# Patient Record
Sex: FEMALE | Race: WHITE | NOT HISPANIC OR LATINO | Employment: OTHER | URBAN - NONMETROPOLITAN AREA
[De-identification: names, ages, dates, MRNs, and addresses within clinical notes are randomized per-mention and may not be internally consistent; named-entity substitution may affect disease eponyms.]

---

## 2023-03-08 DIAGNOSIS — I48.91 ATRIAL FIBRILLATION, UNSPECIFIED TYPE (MULTI): Primary | ICD-10-CM

## 2023-03-08 PROBLEM — F41.9 ANXIETY AND DEPRESSION: Status: ACTIVE | Noted: 2023-03-08

## 2023-03-08 PROBLEM — I10 HYPERTENSION, BENIGN: Status: ACTIVE | Noted: 2023-03-08

## 2023-03-08 PROBLEM — E03.9 HYPOTHYROIDISM: Status: ACTIVE | Noted: 2023-03-08

## 2023-03-08 PROBLEM — I42.8 NON-ISCHEMIC CARDIOMYOPATHY (MULTI): Status: ACTIVE | Noted: 2023-03-08

## 2023-03-08 PROBLEM — M81.0 OSTEOPOROSIS: Status: ACTIVE | Noted: 2023-03-08

## 2023-03-08 PROBLEM — Z97.4 USES HEARING AID: Status: ACTIVE | Noted: 2023-03-08

## 2023-03-08 PROBLEM — E78.5 DYSLIPIDEMIA: Status: ACTIVE | Noted: 2023-03-08

## 2023-03-08 PROBLEM — F32.0 MILD MAJOR DEPRESSION (CMS-HCC): Status: ACTIVE | Noted: 2023-03-08

## 2023-03-08 PROBLEM — H91.93 HEARING LOSS, BILATERAL: Status: ACTIVE | Noted: 2023-03-08

## 2023-03-08 PROBLEM — F32.A ANXIETY AND DEPRESSION: Status: ACTIVE | Noted: 2023-03-08

## 2023-03-08 RX ORDER — RIVAROXABAN 20 MG/1
1 TABLET, FILM COATED ORAL
COMMUNITY
Start: 2019-01-28 | End: 2024-02-19 | Stop reason: WASHOUT

## 2023-03-08 RX ORDER — ATORVASTATIN CALCIUM 20 MG/1
20 TABLET, FILM COATED ORAL DAILY
COMMUNITY
End: 2024-02-19 | Stop reason: WASHOUT

## 2023-03-08 RX ORDER — LEVOTHYROXINE SODIUM 75 UG/1
1 TABLET ORAL DAILY
COMMUNITY
Start: 2020-08-03 | End: 2024-02-19 | Stop reason: WASHOUT

## 2023-03-08 RX ORDER — CARVEDILOL 12.5 MG/1
12.5 TABLET ORAL 2 TIMES DAILY
Qty: 180 TABLET | Refills: 3 | Status: SHIPPED | OUTPATIENT
Start: 2023-03-08 | End: 2024-02-19 | Stop reason: WASHOUT

## 2023-03-08 RX ORDER — CYCLOBENZAPRINE HCL 10 MG
TABLET ORAL
COMMUNITY
Start: 2023-02-22

## 2023-03-08 RX ORDER — PANTOPRAZOLE SODIUM 40 MG/1
1 TABLET, DELAYED RELEASE ORAL DAILY
COMMUNITY
Start: 2020-11-04

## 2023-03-08 RX ORDER — HYDROXYZINE HYDROCHLORIDE 10 MG/1
TABLET, FILM COATED ORAL
COMMUNITY
Start: 2021-06-01

## 2023-03-08 RX ORDER — LOSARTAN POTASSIUM 25 MG/1
1 TABLET ORAL DAILY
COMMUNITY
End: 2024-02-19 | Stop reason: WASHOUT

## 2023-03-08 RX ORDER — CARVEDILOL 12.5 MG/1
1 TABLET ORAL 2 TIMES DAILY
COMMUNITY
Start: 2021-12-02 | End: 2023-03-08 | Stop reason: SDUPTHER

## 2023-04-25 LAB
ALANINE AMINOTRANSFERASE (SGPT) (U/L) IN SER/PLAS: 15 U/L (ref 7–45)
ALBUMIN (G/DL) IN SER/PLAS: 4.3 G/DL (ref 3.4–5)
ALKALINE PHOSPHATASE (U/L) IN SER/PLAS: 68 U/L (ref 33–136)
ANION GAP IN SER/PLAS: 13 MMOL/L (ref 10–20)
ASPARTATE AMINOTRANSFERASE (SGOT) (U/L) IN SER/PLAS: 20 U/L (ref 9–39)
BILIRUBIN TOTAL (MG/DL) IN SER/PLAS: 0.7 MG/DL (ref 0–1.2)
CALCIUM (MG/DL) IN SER/PLAS: 9.2 MG/DL (ref 8.6–10.3)
CARBON DIOXIDE, TOTAL (MMOL/L) IN SER/PLAS: 26 MMOL/L (ref 21–32)
CHLORIDE (MMOL/L) IN SER/PLAS: 107 MMOL/L (ref 98–107)
CHOLESTEROL (MG/DL) IN SER/PLAS: 141 MG/DL (ref 0–199)
CHOLESTEROL IN HDL (MG/DL) IN SER/PLAS: 62.5 MG/DL
CHOLESTEROL/HDL RATIO: 2.3
CREATININE (MG/DL) IN SER/PLAS: 0.96 MG/DL (ref 0.5–1.05)
ERYTHROCYTE DISTRIBUTION WIDTH (RATIO) BY AUTOMATED COUNT: 12.7 % (ref 11.5–14.5)
ERYTHROCYTE MEAN CORPUSCULAR HEMOGLOBIN CONCENTRATION (G/DL) BY AUTOMATED: 30.3 G/DL (ref 32–36)
ERYTHROCYTE MEAN CORPUSCULAR VOLUME (FL) BY AUTOMATED COUNT: 100 FL (ref 80–100)
ERYTHROCYTES (10*6/UL) IN BLOOD BY AUTOMATED COUNT: 4.47 X10E12/L (ref 4–5.2)
GFR FEMALE: 64 ML/MIN/1.73M2
GLUCOSE (MG/DL) IN SER/PLAS: 86 MG/DL (ref 74–99)
HEMATOCRIT (%) IN BLOOD BY AUTOMATED COUNT: 44.9 % (ref 36–46)
HEMOGLOBIN (G/DL) IN BLOOD: 13.6 G/DL (ref 12–16)
LDL: 64 MG/DL (ref 0–99)
LEUKOCYTES (10*3/UL) IN BLOOD BY AUTOMATED COUNT: 7.4 X10E9/L (ref 4.4–11.3)
PLATELETS (10*3/UL) IN BLOOD AUTOMATED COUNT: 264 X10E9/L (ref 150–450)
POTASSIUM (MMOL/L) IN SER/PLAS: 4 MMOL/L (ref 3.5–5.3)
PROTEIN TOTAL: 7.2 G/DL (ref 6.4–8.2)
SODIUM (MMOL/L) IN SER/PLAS: 142 MMOL/L (ref 136–145)
THYROTROPIN (MIU/L) IN SER/PLAS BY DETECTION LIMIT <= 0.05 MIU/L: 0.9 MIU/L (ref 0.44–3.98)
TRIGLYCERIDE (MG/DL) IN SER/PLAS: 71 MG/DL (ref 0–149)
UREA NITROGEN (MG/DL) IN SER/PLAS: 19 MG/DL (ref 6–23)
VLDL: 14 MG/DL (ref 0–40)

## 2023-04-26 DIAGNOSIS — E55.9 VITAMIN D DEFICIENCY: Primary | ICD-10-CM

## 2023-04-26 LAB
CALCIDIOL (25 OH VITAMIN D3) (NG/ML) IN SER/PLAS: 20 NG/ML
COBALAMIN (VITAMIN B12) (PG/ML) IN SER/PLAS: 414 PG/ML (ref 211–911)

## 2023-04-26 RX ORDER — ERGOCALCIFEROL 1.25 MG/1
50000 CAPSULE ORAL
Qty: 12 CAPSULE | Refills: 0 | Status: SHIPPED | OUTPATIENT
Start: 2023-04-26 | End: 2024-02-22 | Stop reason: WASHOUT

## 2023-08-22 ENCOUNTER — OFFICE VISIT (OUTPATIENT)
Dept: PRIMARY CARE | Facility: CLINIC | Age: 68
End: 2023-08-22
Payer: MEDICARE

## 2023-08-22 ENCOUNTER — DOCUMENTATION (OUTPATIENT)
Dept: PRIMARY CARE | Facility: CLINIC | Age: 68
End: 2023-08-22
Payer: MEDICARE

## 2023-08-22 VITALS
TEMPERATURE: 97.6 F | WEIGHT: 155 LBS | OXYGEN SATURATION: 98 % | DIASTOLIC BLOOD PRESSURE: 60 MMHG | BODY MASS INDEX: 27.46 KG/M2 | SYSTOLIC BLOOD PRESSURE: 110 MMHG | HEIGHT: 63 IN | HEART RATE: 63 BPM

## 2023-08-22 DIAGNOSIS — I42.8 NON-ISCHEMIC CARDIOMYOPATHY (MULTI): ICD-10-CM

## 2023-08-22 DIAGNOSIS — I10 ESSENTIAL HYPERTENSION: Primary | ICD-10-CM

## 2023-08-22 DIAGNOSIS — I48.91 ATRIAL FIBRILLATION, UNSPECIFIED TYPE (MULTI): ICD-10-CM

## 2023-08-22 PROBLEM — F32.0 MILD MAJOR DEPRESSION (CMS-HCC): Status: RESOLVED | Noted: 2023-03-08 | Resolved: 2023-08-22

## 2023-08-22 PROCEDURE — 3078F DIAST BP <80 MM HG: CPT | Performed by: FAMILY MEDICINE

## 2023-08-22 PROCEDURE — 3008F BODY MASS INDEX DOCD: CPT | Performed by: FAMILY MEDICINE

## 2023-08-22 PROCEDURE — 1036F TOBACCO NON-USER: CPT | Performed by: FAMILY MEDICINE

## 2023-08-22 PROCEDURE — 3074F SYST BP LT 130 MM HG: CPT | Performed by: FAMILY MEDICINE

## 2023-08-22 PROCEDURE — 99212 OFFICE O/P EST SF 10 MIN: CPT | Performed by: FAMILY MEDICINE

## 2023-08-22 PROCEDURE — 1160F RVW MEDS BY RX/DR IN RCRD: CPT | Performed by: FAMILY MEDICINE

## 2023-08-22 PROCEDURE — 1159F MED LIST DOCD IN RCRD: CPT | Performed by: FAMILY MEDICINE

## 2023-08-22 ASSESSMENT — ENCOUNTER SYMPTOMS
DEPRESSION: 0
LOSS OF SENSATION IN FEET: 0
OCCASIONAL FEELINGS OF UNSTEADINESS: 0

## 2023-08-22 ASSESSMENT — PATIENT HEALTH QUESTIONNAIRE - PHQ9
SUM OF ALL RESPONSES TO PHQ9 QUESTIONS 1 AND 2: 0
2. FEELING DOWN, DEPRESSED OR HOPELESS: NOT AT ALL
1. LITTLE INTEREST OR PLEASURE IN DOING THINGS: NOT AT ALL

## 2023-08-22 NOTE — PROGRESS NOTES
"Subjective   Patient ID: Nadege Gillette is a 68 y.o. female who presents for Follow-up (No concerns).  HPI  Here for follow up  Has HTN under control with meds  Has history of A-fib and cardiomyopathy, no issues, no chest pain, no palpitations     Review of Systems  General: no fever  Eyes: no blurry vision  ENT: no sore throat, no ear pain  Resp: no cough, sob or wheezing  Cardio: no chest pain, no palpitations  Abd: no nausea/vomiting  : no dysuria, no increased urinary frequency      /60   Pulse 63   Temp 36.4 °C (97.6 °F)   Ht 1.6 m (5' 3\")   Wt 70.3 kg (155 lb)   SpO2 98%   BMI 27.46 kg/m²       Objective   Physical Exam  Gen: NAD, alert  Head: normocephalic/atraumatic  Eyes: conjunctivae normal  Ears: canals clear bilaterally, TM normal   Nose: external nose normal   Oropharynx: clear   Resp: Clear to auscultation  CVS: Regular rate and rhythm  Abdomen: soft, NT, ND  Ext: no edema, NT of lower extremities  Neuro: gait normal     Assessment/Plan   Problem List Items Addressed This Visit       A-fib (CMS/HCC)    Non-ischemic cardiomyopathy (CMS/HCC)     Other Visit Diagnoses       Essential hypertension    -  Primary    BMI 27.0-27.9,adult                   "

## 2023-11-24 ENCOUNTER — PHARMACY VISIT (OUTPATIENT)
Dept: PHARMACY | Facility: CLINIC | Age: 68
End: 2023-11-24
Payer: COMMERCIAL

## 2023-11-24 PROCEDURE — RXMED WILLOW AMBULATORY MEDICATION CHARGE

## 2023-12-27 PROCEDURE — RXMED WILLOW AMBULATORY MEDICATION CHARGE

## 2024-01-02 ENCOUNTER — PHARMACY VISIT (OUTPATIENT)
Dept: PHARMACY | Facility: CLINIC | Age: 69
End: 2024-01-02
Payer: COMMERCIAL

## 2024-01-02 PROCEDURE — RXMED WILLOW AMBULATORY MEDICATION CHARGE

## 2024-02-07 ENCOUNTER — TELEPHONE (OUTPATIENT)
Dept: PRIMARY CARE | Facility: CLINIC | Age: 69
End: 2024-02-07
Payer: MEDICARE

## 2024-02-17 DIAGNOSIS — E78.5 DYSLIPIDEMIA: ICD-10-CM

## 2024-02-17 DIAGNOSIS — I10 HYPERTENSION, BENIGN: ICD-10-CM

## 2024-02-17 DIAGNOSIS — E03.9 HYPOTHYROIDISM, UNSPECIFIED TYPE: Primary | ICD-10-CM

## 2024-02-17 DIAGNOSIS — I48.91 ATRIAL FIBRILLATION, UNSPECIFIED TYPE (MULTI): ICD-10-CM

## 2024-02-19 PROCEDURE — RXMED WILLOW AMBULATORY MEDICATION CHARGE

## 2024-02-19 RX ORDER — CARVEDILOL 12.5 MG/1
12.5 TABLET ORAL 2 TIMES DAILY
Qty: 180 TABLET | Refills: 3 | Status: SHIPPED | OUTPATIENT
Start: 2024-02-19 | End: 2025-02-18

## 2024-02-19 RX ORDER — LOSARTAN POTASSIUM 25 MG/1
25 TABLET ORAL DAILY
Qty: 90 TABLET | Refills: 3 | Status: SHIPPED | OUTPATIENT
Start: 2024-02-19 | End: 2025-02-18

## 2024-02-19 RX ORDER — ATORVASTATIN CALCIUM 20 MG/1
20 TABLET, FILM COATED ORAL DAILY
Qty: 90 TABLET | Refills: 3 | Status: SHIPPED | OUTPATIENT
Start: 2024-02-19 | End: 2025-02-18

## 2024-02-19 RX ORDER — LEVOTHYROXINE SODIUM 75 UG/1
75 TABLET ORAL DAILY
Qty: 90 TABLET | Refills: 3 | Status: SHIPPED | OUTPATIENT
Start: 2024-02-19 | End: 2025-02-18

## 2024-02-22 ENCOUNTER — OFFICE VISIT (OUTPATIENT)
Dept: PRIMARY CARE | Facility: CLINIC | Age: 69
End: 2024-02-22
Payer: MEDICARE

## 2024-02-22 VITALS
WEIGHT: 157 LBS | OXYGEN SATURATION: 96 % | TEMPERATURE: 97 F | DIASTOLIC BLOOD PRESSURE: 57 MMHG | HEIGHT: 63 IN | SYSTOLIC BLOOD PRESSURE: 133 MMHG | HEART RATE: 73 BPM | BODY MASS INDEX: 27.82 KG/M2

## 2024-02-22 DIAGNOSIS — I42.8 NON-ISCHEMIC CARDIOMYOPATHY (MULTI): ICD-10-CM

## 2024-02-22 DIAGNOSIS — Z00.00 MEDICARE ANNUAL WELLNESS VISIT, SUBSEQUENT: Primary | ICD-10-CM

## 2024-02-22 DIAGNOSIS — Z78.0 ASYMPTOMATIC POSTMENOPAUSAL STATE: ICD-10-CM

## 2024-02-22 DIAGNOSIS — Z12.31 ENCOUNTER FOR SCREENING MAMMOGRAM FOR MALIGNANT NEOPLASM OF BREAST: ICD-10-CM

## 2024-02-22 DIAGNOSIS — I48.91 ATRIAL FIBRILLATION, UNSPECIFIED TYPE (MULTI): ICD-10-CM

## 2024-02-22 DIAGNOSIS — E03.9 HYPOTHYROIDISM, UNSPECIFIED TYPE: ICD-10-CM

## 2024-02-22 PROCEDURE — 1170F FXNL STATUS ASSESSED: CPT | Performed by: FAMILY MEDICINE

## 2024-02-22 PROCEDURE — 3075F SYST BP GE 130 - 139MM HG: CPT | Performed by: FAMILY MEDICINE

## 2024-02-22 PROCEDURE — 3008F BODY MASS INDEX DOCD: CPT | Performed by: FAMILY MEDICINE

## 2024-02-22 PROCEDURE — 1159F MED LIST DOCD IN RCRD: CPT | Performed by: FAMILY MEDICINE

## 2024-02-22 PROCEDURE — 1036F TOBACCO NON-USER: CPT | Performed by: FAMILY MEDICINE

## 2024-02-22 PROCEDURE — 1124F ACP DISCUSS-NO DSCNMKR DOCD: CPT | Performed by: FAMILY MEDICINE

## 2024-02-22 PROCEDURE — 3078F DIAST BP <80 MM HG: CPT | Performed by: FAMILY MEDICINE

## 2024-02-22 PROCEDURE — 1160F RVW MEDS BY RX/DR IN RCRD: CPT | Performed by: FAMILY MEDICINE

## 2024-02-22 PROCEDURE — G0439 PPPS, SUBSEQ VISIT: HCPCS | Performed by: FAMILY MEDICINE

## 2024-02-22 ASSESSMENT — PATIENT HEALTH QUESTIONNAIRE - PHQ9
2. FEELING DOWN, DEPRESSED OR HOPELESS: NOT AT ALL
1. LITTLE INTEREST OR PLEASURE IN DOING THINGS: NOT AT ALL
SUM OF ALL RESPONSES TO PHQ9 QUESTIONS 1 AND 2: 0

## 2024-02-22 ASSESSMENT — ACTIVITIES OF DAILY LIVING (ADL)
BATHING: INDEPENDENT
GROCERY_SHOPPING: INDEPENDENT
DRESSING: INDEPENDENT
TAKING_MEDICATION: INDEPENDENT
MANAGING_FINANCES: INDEPENDENT
DOING_HOUSEWORK: INDEPENDENT

## 2024-02-22 ASSESSMENT — ENCOUNTER SYMPTOMS
DEPRESSION: 0
LOSS OF SENSATION IN FEET: 0
OCCASIONAL FEELINGS OF UNSTEADINESS: 0

## 2024-02-22 NOTE — PROGRESS NOTES
"Subjective   Reason for Visit: Nadege Gillette is an 68 y.o. female here for a Medicare Wellness visit.     Past Medical, Surgical, and Family History reviewed and updated in chart.    Reviewed all medications by prescribing practitioner or clinical pharmacist (such as prescriptions, OTCs, herbal therapies and supplements) and documented in the medical record.    HPI  Here for medicare annual visit  HTN under control with meds, no issues  Has hypothyroidism, compliant with levothyroxine  Has history of cardiomyopathy and A-fib, stable, compliant with xarelto and coreg    Patient Care Team:  Rj Green MD as PCP - General  Rj Green MD as PCP - [employer] PCP  Evita Clement MD     Review of Systems  General: no fever  Eyes: no blurry vision  ENT: no sore throat, no ear pain  Resp: no cough, sob or wheezing  Cardio: no chest pain, no palpitations  Abd: no nausea/vomiting  : no dysuria, no increased urinary frequency    Objective   Vitals:  /57   Pulse 73   Temp 36.1 °C (97 °F)   Ht 1.6 m (5' 2.99\")   Wt 71.2 kg (157 lb)   SpO2 96%   BMI 27.82 kg/m²       Physical Exam  Gen: NAD, alert  Head: normocephalic/atraumatic  Eyes: conjunctivae normal  Ears: canals clear bilaterally, TM normal   Nose: external nose normal   Oropharynx: clear   Resp: Clear to auscultation  CVS: Regular rate and rhythm  Abdomen: soft, NT, ND  Ext: no edema, NT of lower extremities  Neuro: gait normal     Assessment/Plan   Problem List Items Addressed This Visit       A-fib (CMS/HCC)  stable    Hypothyroidism    Relevant Orders    Lipid Panel    TSH with reflex to Free T4 if abnormal    CBC    Comprehensive Metabolic Panel    Non-ischemic cardiomyopathy (CMS/HCC)  stable     Other Visit Diagnoses       Medicare annual wellness visit, subsequent    -  Primary    BMI 27.0-27.9,adult        Asymptomatic postmenopausal state        Relevant Orders    XR DEXA bone density    Encounter for " screening mammogram for malignant neoplasm of breast        Relevant Orders    BI mammo bilateral screening tomosynthesis

## 2024-02-23 ENCOUNTER — PHARMACY VISIT (OUTPATIENT)
Dept: PHARMACY | Facility: CLINIC | Age: 69
End: 2024-02-23
Payer: COMMERCIAL

## 2024-02-23 PROCEDURE — RXMED WILLOW AMBULATORY MEDICATION CHARGE

## 2024-03-04 ENCOUNTER — LAB (OUTPATIENT)
Dept: LAB | Facility: LAB | Age: 69
End: 2024-03-04
Payer: MEDICARE

## 2024-03-15 ENCOUNTER — OFFICE VISIT (OUTPATIENT)
Dept: PRIMARY CARE | Facility: CLINIC | Age: 69
End: 2024-03-15
Payer: MEDICARE

## 2024-03-15 VITALS
BODY MASS INDEX: 27.64 KG/M2 | DIASTOLIC BLOOD PRESSURE: 80 MMHG | OXYGEN SATURATION: 96 % | HEIGHT: 63 IN | WEIGHT: 156 LBS | SYSTOLIC BLOOD PRESSURE: 130 MMHG | HEART RATE: 56 BPM | TEMPERATURE: 97 F

## 2024-03-15 DIAGNOSIS — S29.012A UPPER BACK STRAIN, INITIAL ENCOUNTER: ICD-10-CM

## 2024-03-15 DIAGNOSIS — J01.00 ACUTE MAXILLARY SINUSITIS, RECURRENCE NOT SPECIFIED: Primary | ICD-10-CM

## 2024-03-15 DIAGNOSIS — R39.9 URINARY SYMPTOM OR SIGN: ICD-10-CM

## 2024-03-15 LAB
POC APPEARANCE, URINE: CLEAR
POC BILIRUBIN, URINE: NEGATIVE
POC BLOOD, URINE: ABNORMAL
POC COLOR, URINE: YELLOW
POC GLUCOSE, URINE: NEGATIVE MG/DL
POC KETONES, URINE: NEGATIVE MG/DL
POC LEUKOCYTES, URINE: ABNORMAL
POC NITRITE,URINE: NEGATIVE
POC PH, URINE: 7 PH
POC PROTEIN, URINE: NEGATIVE MG/DL
POC SPECIFIC GRAVITY, URINE: 1.01
POC UROBILINOGEN, URINE: 0.2 EU/DL

## 2024-03-15 PROCEDURE — 3008F BODY MASS INDEX DOCD: CPT | Performed by: FAMILY MEDICINE

## 2024-03-15 PROCEDURE — 3075F SYST BP GE 130 - 139MM HG: CPT | Performed by: FAMILY MEDICINE

## 2024-03-15 PROCEDURE — 1036F TOBACCO NON-USER: CPT | Performed by: FAMILY MEDICINE

## 2024-03-15 PROCEDURE — 3079F DIAST BP 80-89 MM HG: CPT | Performed by: FAMILY MEDICINE

## 2024-03-15 PROCEDURE — 1160F RVW MEDS BY RX/DR IN RCRD: CPT | Performed by: FAMILY MEDICINE

## 2024-03-15 PROCEDURE — 1159F MED LIST DOCD IN RCRD: CPT | Performed by: FAMILY MEDICINE

## 2024-03-15 PROCEDURE — 87086 URINE CULTURE/COLONY COUNT: CPT

## 2024-03-15 PROCEDURE — 99213 OFFICE O/P EST LOW 20 MIN: CPT | Performed by: FAMILY MEDICINE

## 2024-03-15 PROCEDURE — 81003 URINALYSIS AUTO W/O SCOPE: CPT | Performed by: FAMILY MEDICINE

## 2024-03-15 RX ORDER — AMOXICILLIN AND CLAVULANATE POTASSIUM 875; 125 MG/1; MG/1
875 TABLET, FILM COATED ORAL 2 TIMES DAILY
Qty: 20 TABLET | Refills: 0 | Status: SHIPPED | OUTPATIENT
Start: 2024-03-15 | End: 2024-03-25

## 2024-03-15 NOTE — PROGRESS NOTES
"Subjective   Patient ID: Nadege Gillette is a 68 y.o. female who presents for Back Pain (Upper back for a couple weeks/Not sleeping good) and Sinusitis (Uses netti pot/Bringing stuff up).  HPI  Here for urgent visit  Has been having upper back pain x couple of weeks, denies any trauma. Has been doing a lot more work  Also has been having sinus pressure and headaches for the same amount of time, no sob or wheezing.   Denies any dysuria, no increased urinary frequency.     Review of Systems  General: no fever  Eyes: no blurry vision  ENT: see HPI  Resp: no cough, sob or wheezing  Cardio: no chest pain, no palpitations  Abd: no nausea/vomiting  : no dysuria, no increased urinary frequency      /80   Pulse 56   Temp 36.1 °C (97 °F)   Ht 1.6 m (5' 2.99\")   Wt 70.8 kg (156 lb)   SpO2 96%   BMI 27.64 kg/m²       Objective   Physical Exam  Gen: NAD, alert  Head: normocephalic/atraumatic  Eyes: conjunctivae normal  Ears: canals clear bilaterally, TM normal   Nose: external nose normal, maxillary sinus tenderness present  Oropharynx: clear   Upper back: no bruising, no rash present  Resp: Clear to auscultation  CVS: Regular rate and rhythm  Abdomen: soft, NT, ND  Ext: no edema, NT of lower extremities  Neuro: gait normal     Assessment/Plan   Problem List Items Addressed This Visit    None  Visit Diagnoses       Acute maxillary sinusitis, recurrence not specified    -  Primary    Relevant Medications    amoxicillin-pot clavulanate (Augmentin) 875-125 mg tablet    Urinary symptom or sign        Relevant Orders    POCT UA Automated manually resulted (Completed)    Urine Culture    Upper back strain, initial encounter    Tylenol PRN, can also try lidocaine patches                 "

## 2024-03-16 LAB — BACTERIA UR CULT: NORMAL

## 2024-03-27 PROCEDURE — RXMED WILLOW AMBULATORY MEDICATION CHARGE

## 2024-03-29 ENCOUNTER — PHARMACY VISIT (OUTPATIENT)
Dept: PHARMACY | Facility: CLINIC | Age: 69
End: 2024-03-29
Payer: COMMERCIAL

## 2024-05-18 PROCEDURE — RXMED WILLOW AMBULATORY MEDICATION CHARGE

## 2024-05-23 ENCOUNTER — PHARMACY VISIT (OUTPATIENT)
Dept: PHARMACY | Facility: CLINIC | Age: 69
End: 2024-05-23
Payer: COMMERCIAL

## 2024-05-28 ENCOUNTER — LAB (OUTPATIENT)
Dept: LAB | Facility: LAB | Age: 69
End: 2024-05-28
Payer: MEDICARE

## 2024-05-28 DIAGNOSIS — E03.9 HYPOTHYROIDISM, UNSPECIFIED TYPE: ICD-10-CM

## 2024-05-28 LAB
ALBUMIN SERPL BCP-MCNC: 4.5 G/DL (ref 3.4–5)
ALP SERPL-CCNC: 68 U/L (ref 33–136)
ALT SERPL W P-5'-P-CCNC: 10 U/L (ref 7–45)
ANION GAP SERPL CALC-SCNC: 10 MMOL/L (ref 10–20)
AST SERPL W P-5'-P-CCNC: 15 U/L (ref 9–39)
BILIRUB SERPL-MCNC: 0.8 MG/DL (ref 0–1.2)
BUN SERPL-MCNC: 12 MG/DL (ref 6–23)
CALCIUM SERPL-MCNC: 9.3 MG/DL (ref 8.6–10.3)
CHLORIDE SERPL-SCNC: 107 MMOL/L (ref 98–107)
CHOLEST SERPL-MCNC: 147 MG/DL (ref 0–199)
CHOLESTEROL/HDL RATIO: 2.5
CO2 SERPL-SCNC: 29 MMOL/L (ref 21–32)
CREAT SERPL-MCNC: 0.91 MG/DL (ref 0.5–1.05)
EGFRCR SERPLBLD CKD-EPI 2021: 68 ML/MIN/1.73M*2
ERYTHROCYTE [DISTWIDTH] IN BLOOD BY AUTOMATED COUNT: 12.4 % (ref 11.5–14.5)
GLUCOSE SERPL-MCNC: 106 MG/DL (ref 74–99)
HCT VFR BLD AUTO: 41.8 % (ref 36–46)
HDLC SERPL-MCNC: 59.3 MG/DL
HGB BLD-MCNC: 13.8 G/DL (ref 12–16)
LDLC SERPL CALC-MCNC: 74 MG/DL
MCH RBC QN AUTO: 30.3 PG (ref 26–34)
MCHC RBC AUTO-ENTMCNC: 33 G/DL (ref 32–36)
MCV RBC AUTO: 92 FL (ref 80–100)
NON HDL CHOLESTEROL: 88 MG/DL (ref 0–149)
NRBC BLD-RTO: 0 /100 WBCS (ref 0–0)
PLATELET # BLD AUTO: 259 X10*3/UL (ref 150–450)
POTASSIUM SERPL-SCNC: 4.3 MMOL/L (ref 3.5–5.3)
PROT SERPL-MCNC: 7 G/DL (ref 6.4–8.2)
RBC # BLD AUTO: 4.56 X10*6/UL (ref 4–5.2)
SODIUM SERPL-SCNC: 142 MMOL/L (ref 136–145)
T4 FREE SERPL-MCNC: 1.13 NG/DL (ref 0.61–1.12)
TRIGL SERPL-MCNC: 70 MG/DL (ref 0–149)
TSH SERPL-ACNC: 0.31 MIU/L (ref 0.44–3.98)
VLDL: 14 MG/DL (ref 0–40)
WBC # BLD AUTO: 8 X10*3/UL (ref 4.4–11.3)

## 2024-05-28 PROCEDURE — 36415 COLL VENOUS BLD VENIPUNCTURE: CPT

## 2024-05-30 ENCOUNTER — HOSPITAL ENCOUNTER (OUTPATIENT)
Dept: RADIOLOGY | Facility: HOSPITAL | Age: 69
Discharge: HOME | End: 2024-05-30
Payer: MEDICARE

## 2024-05-30 VITALS — BODY MASS INDEX: 24.11 KG/M2 | WEIGHT: 150 LBS | HEIGHT: 66 IN

## 2024-05-30 DIAGNOSIS — Z78.0 ASYMPTOMATIC POSTMENOPAUSAL STATE: ICD-10-CM

## 2024-05-30 DIAGNOSIS — Z12.31 ENCOUNTER FOR SCREENING MAMMOGRAM FOR MALIGNANT NEOPLASM OF BREAST: ICD-10-CM

## 2024-05-30 PROCEDURE — 77067 SCR MAMMO BI INCL CAD: CPT

## 2024-05-30 PROCEDURE — 77080 DXA BONE DENSITY AXIAL: CPT | Performed by: STUDENT IN AN ORGANIZED HEALTH CARE EDUCATION/TRAINING PROGRAM

## 2024-05-30 PROCEDURE — 77080 DXA BONE DENSITY AXIAL: CPT

## 2024-05-30 PROCEDURE — 77067 SCR MAMMO BI INCL CAD: CPT | Performed by: RADIOLOGY

## 2024-05-30 PROCEDURE — 77063 BREAST TOMOSYNTHESIS BI: CPT | Performed by: RADIOLOGY

## 2024-06-10 ENCOUNTER — PHARMACY VISIT (OUTPATIENT)
Dept: PHARMACY | Facility: CLINIC | Age: 69
End: 2024-06-10
Payer: COMMERCIAL

## 2024-06-10 PROCEDURE — RXMED WILLOW AMBULATORY MEDICATION CHARGE

## 2024-07-01 ENCOUNTER — PHARMACY VISIT (OUTPATIENT)
Dept: PHARMACY | Facility: CLINIC | Age: 69
End: 2024-07-01
Payer: COMMERCIAL

## 2024-07-01 PROCEDURE — RXMED WILLOW AMBULATORY MEDICATION CHARGE

## 2024-07-16 DIAGNOSIS — E03.9 HYPOTHYROIDISM, UNSPECIFIED TYPE: ICD-10-CM

## 2024-07-16 RX ORDER — LEVOTHYROXINE SODIUM 75 UG/1
TABLET ORAL
Qty: 90 TABLET | Refills: 3 | Status: SHIPPED | OUTPATIENT
Start: 2024-07-16

## 2024-08-14 ENCOUNTER — PHARMACY VISIT (OUTPATIENT)
Dept: PHARMACY | Facility: CLINIC | Age: 69
End: 2024-08-14
Payer: COMMERCIAL

## 2024-08-14 ENCOUNTER — HOSPITAL ENCOUNTER (OUTPATIENT)
Dept: RADIOLOGY | Facility: HOSPITAL | Age: 69
Discharge: HOME | End: 2024-08-14
Payer: MEDICARE

## 2024-08-14 ENCOUNTER — APPOINTMENT (OUTPATIENT)
Dept: PRIMARY CARE | Facility: CLINIC | Age: 69
End: 2024-08-14
Payer: MEDICARE

## 2024-08-14 VITALS
DIASTOLIC BLOOD PRESSURE: 60 MMHG | SYSTOLIC BLOOD PRESSURE: 122 MMHG | BODY MASS INDEX: 23.53 KG/M2 | WEIGHT: 146.4 LBS | TEMPERATURE: 97 F | OXYGEN SATURATION: 97 % | HEART RATE: 53 BPM | HEIGHT: 66 IN

## 2024-08-14 DIAGNOSIS — M54.9 CHRONIC UPPER BACK PAIN: ICD-10-CM

## 2024-08-14 DIAGNOSIS — G89.29 CHRONIC UPPER BACK PAIN: ICD-10-CM

## 2024-08-14 DIAGNOSIS — F32.0 MILD MAJOR DEPRESSION (CMS-HCC): ICD-10-CM

## 2024-08-14 DIAGNOSIS — F41.9 ANXIETY: ICD-10-CM

## 2024-08-14 DIAGNOSIS — E03.9 HYPOTHYROIDISM, UNSPECIFIED TYPE: ICD-10-CM

## 2024-08-14 DIAGNOSIS — E03.9 HYPOTHYROIDISM, UNSPECIFIED TYPE: Primary | ICD-10-CM

## 2024-08-14 PROCEDURE — 3074F SYST BP LT 130 MM HG: CPT | Performed by: FAMILY MEDICINE

## 2024-08-14 PROCEDURE — RXMED WILLOW AMBULATORY MEDICATION CHARGE

## 2024-08-14 PROCEDURE — 3078F DIAST BP <80 MM HG: CPT | Performed by: FAMILY MEDICINE

## 2024-08-14 PROCEDURE — 72072 X-RAY EXAM THORAC SPINE 3VWS: CPT

## 2024-08-14 PROCEDURE — 3008F BODY MASS INDEX DOCD: CPT | Performed by: FAMILY MEDICINE

## 2024-08-14 PROCEDURE — 72072 X-RAY EXAM THORAC SPINE 3VWS: CPT | Performed by: RADIOLOGY

## 2024-08-14 PROCEDURE — 99214 OFFICE O/P EST MOD 30 MIN: CPT | Performed by: FAMILY MEDICINE

## 2024-08-14 RX ORDER — LEVOTHYROXINE SODIUM 75 UG/1
75 TABLET ORAL DAILY
Qty: 90 TABLET | Refills: 3 | Status: SHIPPED | OUTPATIENT
Start: 2024-08-14

## 2024-08-14 RX ORDER — HYDROXYZINE HYDROCHLORIDE 10 MG/1
10 TABLET, FILM COATED ORAL EVERY 12 HOURS PRN
Qty: 60 TABLET | Refills: 1 | Status: SHIPPED | OUTPATIENT
Start: 2024-08-14

## 2024-08-14 RX ORDER — ESCITALOPRAM OXALATE 5 MG/1
5 TABLET ORAL DAILY
Qty: 30 TABLET | Refills: 2 | Status: SHIPPED | OUTPATIENT
Start: 2024-08-14 | End: 2024-11-12

## 2024-08-14 RX ORDER — TIZANIDINE 2 MG/1
2 TABLET ORAL NIGHTLY PRN
Qty: 30 TABLET | Refills: 3 | Status: SHIPPED | OUTPATIENT
Start: 2024-08-14

## 2024-08-14 RX ORDER — LEVOTHYROXINE SODIUM 75 UG/1
75 TABLET ORAL DAILY
Qty: 90 TABLET | Refills: 3 | OUTPATIENT
Start: 2024-08-14 | End: 2025-08-14

## 2024-08-14 ASSESSMENT — ENCOUNTER SYMPTOMS
LOSS OF SENSATION IN FEET: 0
OCCASIONAL FEELINGS OF UNSTEADINESS: 0
DEPRESSION: 0

## 2024-08-14 ASSESSMENT — PATIENT HEALTH QUESTIONNAIRE - PHQ9
2. FEELING DOWN, DEPRESSED OR HOPELESS: NOT AT ALL
SUM OF ALL RESPONSES TO PHQ9 QUESTIONS 1 AND 2: 0
1. LITTLE INTEREST OR PLEASURE IN DOING THINGS: NOT AT ALL

## 2024-08-14 NOTE — PROGRESS NOTES
"Subjective   Patient ID: Nadege Gillette is a 69 y.o. female who presents for Follow-up (Left side top back muscle pain/Thyroid abnormal ) and Anxiety (Very anxious/).    HPI  Here for follow up   Has hypothyroidism, compliant with levothyroxine, due for recheck of her TSH  Has been very anxious, has decreased motivation. Not sleeping, easily crying.   Has been having upper back pain x couple of years, now it's constant. Using heating pad. No numbness/tingling. No trauma. Using tylenol and biofreeze    Review of Systems  General: no fever  Eyes: no blurry vision  ENT: no sore throat, no ear pain  Resp: no cough, sob or wheezing  Cardio: no chest pain, no palpitations  Abd: no nausea/vomiting  : no dysuria, no increased urinary frequency      /60   Pulse 53   Temp 36.1 °C (97 °F)   Ht 1.676 m (5' 6\")   Wt 66.4 kg (146 lb 6.4 oz)   SpO2 97%   BMI 23.63 kg/m²       Objective   Physical Exam  Gen: NAD, alert  Head: normocephalic/atraumatic  Eyes: conjunctivae normal  Ears: canals clear bilaterally, TM normal   Nose: external nose normal   Oropharynx: clear   Resp: Clear to auscultation  CVS: Regular rate and rhythm  Abdomen: soft, NT, ND  Ext: no edema, NT of lower extremities  Neuro: gait normal     Assessment/Plan   Problem List Items Addressed This Visit       Hypothyroidism - Primary    Relevant Medications    levothyroxine (Synthroid, Levoxyl) 75 mcg tablet    Other Relevant Orders    TSH with reflex to Free T4 if abnormal     Other Visit Diagnoses       Chronic upper back pain        Relevant Medications    tiZANidine (Zanaflex) 2 mg tablet    Other Relevant Orders    Referral to Pain Medicine    Mild major depression (CMS-HCC)        Relevant Medications    escitalopram (Lexapro) 5 mg tablet    Anxiety        Relevant Medications    escitalopram (Lexapro) 5 mg tablet    hydrOXYzine HCL (Atarax) 10 mg tablet               "

## 2024-09-03 ENCOUNTER — TELEPHONE (OUTPATIENT)
Dept: PRIMARY CARE | Facility: CLINIC | Age: 69
End: 2024-09-03
Payer: MEDICARE

## 2024-09-03 DIAGNOSIS — G89.29 CHRONIC UPPER BACK PAIN: Primary | ICD-10-CM

## 2024-09-03 DIAGNOSIS — M54.9 CHRONIC UPPER BACK PAIN: Primary | ICD-10-CM

## 2024-09-03 NOTE — TELEPHONE ENCOUNTER
Patient taking Tizanidine 2mg-    supposed to be at bedtime-PRN but she is taking Q6-8 hours-   this is not helping her pain.  She is scheduled with Pain Management but not until November 1.     Is there anything you can do to help her until she sees PM?

## 2024-09-04 RX ORDER — PREDNISONE 20 MG/1
TABLET ORAL
Qty: 18 TABLET | Refills: 0 | Status: SHIPPED | OUTPATIENT
Start: 2024-09-04

## 2024-09-04 RX ORDER — CYCLOBENZAPRINE HCL 5 MG
5 TABLET ORAL EVERY 12 HOURS PRN
Qty: 30 TABLET | Refills: 0 | Status: SHIPPED | OUTPATIENT
Start: 2024-09-04 | End: 2024-11-03

## 2024-09-04 NOTE — TELEPHONE ENCOUNTER
Switched to flexeril, sent steroids, don't see any appt made with pain management, advised need to call and get this scheduled.

## 2024-09-05 PROCEDURE — RXMED WILLOW AMBULATORY MEDICATION CHARGE

## 2024-09-09 ENCOUNTER — PHARMACY VISIT (OUTPATIENT)
Dept: PHARMACY | Facility: CLINIC | Age: 69
End: 2024-09-09
Payer: COMMERCIAL

## 2024-09-24 PROCEDURE — RXMED WILLOW AMBULATORY MEDICATION CHARGE

## 2024-09-26 ENCOUNTER — PHARMACY VISIT (OUTPATIENT)
Dept: PHARMACY | Facility: CLINIC | Age: 69
End: 2024-09-26
Payer: COMMERCIAL

## 2024-09-26 ENCOUNTER — LAB (OUTPATIENT)
Dept: LAB | Facility: LAB | Age: 69
End: 2024-09-26
Payer: MEDICARE

## 2024-09-26 DIAGNOSIS — E03.9 HYPOTHYROIDISM, UNSPECIFIED TYPE: ICD-10-CM

## 2024-09-26 LAB — TSH SERPL-ACNC: 2.01 MIU/L (ref 0.44–3.98)

## 2024-09-26 PROCEDURE — 36415 COLL VENOUS BLD VENIPUNCTURE: CPT

## 2024-10-02 ENCOUNTER — APPOINTMENT (OUTPATIENT)
Dept: PAIN MEDICINE | Facility: HOSPITAL | Age: 69
End: 2024-10-02
Payer: MEDICARE

## 2024-10-10 PROCEDURE — RXMED WILLOW AMBULATORY MEDICATION CHARGE

## 2024-10-12 ENCOUNTER — PHARMACY VISIT (OUTPATIENT)
Dept: PHARMACY | Facility: CLINIC | Age: 69
End: 2024-10-12
Payer: COMMERCIAL

## 2024-10-26 PROCEDURE — RXMED WILLOW AMBULATORY MEDICATION CHARGE

## 2024-10-28 PROCEDURE — RXMED WILLOW AMBULATORY MEDICATION CHARGE

## 2024-10-29 ENCOUNTER — PHARMACY VISIT (OUTPATIENT)
Dept: PHARMACY | Facility: CLINIC | Age: 69
End: 2024-10-29
Payer: COMMERCIAL

## 2024-11-01 ENCOUNTER — OFFICE VISIT (OUTPATIENT)
Dept: PAIN MEDICINE | Facility: HOSPITAL | Age: 69
End: 2024-11-01
Payer: MEDICARE

## 2024-11-01 ENCOUNTER — APPOINTMENT (OUTPATIENT)
Dept: PAIN MEDICINE | Facility: HOSPITAL | Age: 69
End: 2024-11-01
Payer: MEDICARE

## 2024-11-01 VITALS
OXYGEN SATURATION: 96 % | TEMPERATURE: 97.6 F | HEART RATE: 51 BPM | HEIGHT: 66 IN | SYSTOLIC BLOOD PRESSURE: 154 MMHG | WEIGHT: 154 LBS | RESPIRATION RATE: 16 BRPM | DIASTOLIC BLOOD PRESSURE: 76 MMHG | BODY MASS INDEX: 24.75 KG/M2

## 2024-11-01 DIAGNOSIS — M48.54XS NONTRAUMATIC COMPRESSION FRACTURE OF T6 VERTEBRA, SEQUELA: ICD-10-CM

## 2024-11-01 DIAGNOSIS — Z79.899 MEDICATION MANAGEMENT: ICD-10-CM

## 2024-11-01 DIAGNOSIS — M79.18 MYOFASCIAL PAIN: Primary | ICD-10-CM

## 2024-11-01 PROBLEM — M48.54XA: Status: ACTIVE | Noted: 2024-11-01

## 2024-11-01 LAB
AMPHETAMINES UR QL SCN: NORMAL
BARBITURATES UR QL SCN: NORMAL
BZE UR QL SCN: NORMAL
CANNABINOIDS UR QL SCN: NORMAL
CREAT UR-MCNC: 61.8 MG/DL (ref 20–320)
PCP UR QL SCN: NORMAL

## 2024-11-01 PROCEDURE — 80307 DRUG TEST PRSMV CHEM ANLYZR: CPT | Performed by: NURSE PRACTITIONER

## 2024-11-01 PROCEDURE — 99213 OFFICE O/P EST LOW 20 MIN: CPT | Performed by: NURSE PRACTITIONER

## 2024-11-01 RX ORDER — BACLOFEN 5 MG/1
5 TABLET ORAL 3 TIMES DAILY PRN
Qty: 90 TABLET | Refills: 1 | Status: SHIPPED | OUTPATIENT
Start: 2024-11-01

## 2024-11-01 ASSESSMENT — COLUMBIA-SUICIDE SEVERITY RATING SCALE - C-SSRS
1. IN THE PAST MONTH, HAVE YOU WISHED YOU WERE DEAD OR WISHED YOU COULD GO TO SLEEP AND NOT WAKE UP?: NO
6. HAVE YOU EVER DONE ANYTHING, STARTED TO DO ANYTHING, OR PREPARED TO DO ANYTHING TO END YOUR LIFE?: NO
2. HAVE YOU ACTUALLY HAD ANY THOUGHTS OF KILLING YOURSELF?: NO

## 2024-11-01 ASSESSMENT — ENCOUNTER SYMPTOMS
GASTROINTESTINAL NEGATIVE: 1
ALLERGIC/IMMUNOLOGIC NEGATIVE: 1
CONSTITUTIONAL NEGATIVE: 1
JOINT SWELLING: 0
ENDOCRINE NEGATIVE: 1
RESPIRATORY NEGATIVE: 1
NEUROLOGICAL NEGATIVE: 1
NECK STIFFNESS: 0
ARTHRALGIAS: 1
CARDIOVASCULAR NEGATIVE: 1
BACK PAIN: 1
EYES NEGATIVE: 1
NECK PAIN: 0
PSYCHIATRIC NEGATIVE: 1
MYALGIAS: 1

## 2024-11-01 ASSESSMENT — PAIN SCALES - GENERAL: PAINLEVEL_OUTOF10: 10-WORST PAIN EVER

## 2024-11-12 ENCOUNTER — EVALUATION (OUTPATIENT)
Dept: PHYSICAL THERAPY | Facility: HOSPITAL | Age: 69
End: 2024-11-12
Payer: MEDICARE

## 2024-11-12 DIAGNOSIS — M48.54XA: ICD-10-CM

## 2024-11-12 DIAGNOSIS — M54.6 THORACIC BACK PAIN: Primary | ICD-10-CM

## 2024-11-12 PROCEDURE — 97161 PT EVAL LOW COMPLEX 20 MIN: CPT | Mod: GP | Performed by: PHYSICAL THERAPIST

## 2024-11-12 SDOH — ECONOMIC STABILITY: FOOD INSECURITY: WITHIN THE PAST 12 MONTHS, YOU WORRIED THAT YOUR FOOD WOULD RUN OUT BEFORE YOU GOT MONEY TO BUY MORE.: NEVER TRUE

## 2024-11-12 SDOH — ECONOMIC STABILITY: FOOD INSECURITY: WITHIN THE PAST 12 MONTHS, THE FOOD YOU BOUGHT JUST DIDN'T LAST AND YOU DIDN'T HAVE MONEY TO GET MORE.: NEVER TRUE

## 2024-11-12 ASSESSMENT — PAIN SCALES - GENERAL: PAINLEVEL_OUTOF10: 0 - NO PAIN

## 2024-11-12 ASSESSMENT — ENCOUNTER SYMPTOMS
DEPRESSION: 1
OCCASIONAL FEELINGS OF UNSTEADINESS: 0
LOSS OF SENSATION IN FEET: 0

## 2024-11-12 ASSESSMENT — PAIN - FUNCTIONAL ASSESSMENT: PAIN_FUNCTIONAL_ASSESSMENT: 0-10

## 2024-11-12 NOTE — PROGRESS NOTES
Physical Therapy  Physical Therapy Orthopedic Evaluation    Patient Name: Nadege Gillette  MRN: [id number]  Encounter Date: 11/12/2024  Time Calculation  Start Time: 1048  Stop Time: 1128  Time Calculation (min): 40 min    PT Evaluation Time Entry  PT Evaluation (Low) Time Entry: 25    Non-Billable Time  Non-billable time: 15  Non-billable time reason: No tx with auth- eval only    Insurance:  Visit number: 1 of 1  Authorization info: Auth required  Payor: [employer] / Plan: [employer] / Product Type: *No Product type* /     Current Problem  Problem List Items Addressed This Visit             ICD-10-CM    Non-traumatic compression fracture of sixth thoracic vertebra (Multi) M48.54XA    Relevant Orders    Follow Up In Physical Therapy    Thoracic back pain - Primary M54.6    Relevant Orders    Follow Up In Physical Therapy     1. Thoracic back pain  Follow Up In Physical Therapy      2. Non-traumatic compression fracture of sixth thoracic vertebra (Multi)  Follow Up In Physical Therapy          General:  General  Reason for Referral: T6 Compression Fracture  Referred By: ESTEFANY Cardenas  Past Medical History Relevant to Rehab: A-Fib, HTN, Osteoporosis  Preferred Learning Style: verbal, visual, written      Precautions:   Precautions  STEADI Fall Risk Score (The score of 4 or more indicates an increased risk of falling): 2  Medical Precautions: Fall precautions, Cardiac precautions    Medical History Form: Reviewed (scanned into chart)    Subjective:   Subjective   Chief Complaint: Patient presents to clinic with insidious onset of thoracic pain due to T6 compression fracture. The patient worked at a candy factory and bagging candy. She started noticing thoracic pain. 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      She notices it most when she works a shift over 6 hours. Her most recent severe onset of pain happened after having to work 2 longer shifts in a row.  She was unable to sleep due to the pain and had to call off the next shift due to the pain.     Onset Date: 11/01/2023  ROSETTE: Insidious    Current Condition:   Worse    Pain:  Pain Assessment: 0-10  0-10 (Numeric) Pain Score: 0 - No pain  Highest: 9/10 pain  Lowest: 0/10 pain  Location: Between the shoulder blades  Description: Muscle cramping/tightness  Aggravating Factors:  Standing/walking long periods at work as well as with repeated UE lifting/carrying  Relieving Factors:  Lay down    Relevant Information (PMH & Previous Tests/Imaging):   X-ray  IMPRESSION:      Mild vertebral compression fracture of the T6 vertebral segment, of  uncertain acuity. Correlation with the patient's clinical  symptomatology can determine the need for MRI for further evaluation.      Mild diffuse thoracic degenerative change.  Previous Interventions/Treatments: Pain management     Prior Level of Function (PLOF)  Patient previously independent with all ADLs  Exercise/Physical Activity: Walking  Work/School: Part-Time food runner  Hobbies: Spending time at home    Patients Living Environment: Lives in multi-level home with bedroom and bathroom on first floor    Primary Language: English    Patient's Goal(s) for Therapy: The patient would like be able to work with decreased pain and perform FA's around the house without difficulty.     Red Flags: Do you have any of the following? No  Fever/chills, unexplained weight changes, dizziness/fainting, unexplained change in bowel or bladder functions, unexplained malaise or muscle weakness, night pain/sweats, numbness or tingling    Objective:  Objective         ROM  Cervical AROM (Degrees)    Flexion: 30  Extension: 34  (L) Side Bend: 15  (R) Side Bend: 20  (L) Rotation: 40  (R) Rotation: 52      Shoulder AROM (Degrees)      (R)  (L)  Flexion: 4+/5  4+/5   Abduction: 4+/5  4+/5    ER at 45: 4/5  4/5     IR at 45: 4+/5  4+/5      Lumbar AROM (%)    Flexion: 90  Extension: 75  (L) Side Bend: 75-  tightness at end range  (R) Side Bend: 90  (L) Rotation: 90  (R) Rotation: 90      Hip AROM (Degrees)      (R)  (L)  Flexion: WFL  WFL   ER:  32  30    IR:  20  20          Strength Testing    Core/Abdominals: Fair TA isometric    Shoulder     (R)  (L)  Flexion: 4+/5  4+/5   Abduction: 4+/5  4+/5    ER at 45: 4/5  4/5     IR at 45: 4+/5  4+/5    Hip      (R)  (L)  Flexion: 5/5  5/5     Extension: 4+/5  4/5    Abduction: 4+/5  4+/5    Adduction: 4+/5  4+/5       Knee    (R)  (L)  Flexion: 4+/5  4+/5      Extension: 5/5  5/5     Ankle    (R)  (L)  Plantarflexion: 5/5  5/5      Dorsiflexion: 5/5  5/5             Functional Screening    Lower Extremity Functional Movements  Transfers: Independent  Gait: antalgic  Assistive Device: no device    Palpation: (+) TTP L thoracic paraspinals    Joint Mobility: Did not assess due to compression fx    Flexibility: Min. Restrict. B pecs    Posture: shoulder rounded forward, head forward, and thoracic scoliosis(mild)          Special Tests  Did not perform due to compression fx    Outcome Measures:  Other Measures  Oswestry Disablity Index (GLORIA): modified GLORIA 16%     EDUCATION:   Individual(s) Educated: Patient  Education Provided: Home exercise program, plan of care, activity modifications, pain management, and injury pathology  Handout(s) Provided: Scanned into chart  Home Program: See below treatment  Risk and Benefits Discussed with Patient/Caregiver/Other: Yes   Patient/Caregiver Demonstrated Understanding: Yes   Plan of Care Discussed and Agreed Upon: Yes   Patient Response to Education: Patient/Caregiver verbalized understanding of information, Patient/Caregiver performed return demonstration of exercises/activities, and Patient/Caregiver asked appropriate questions    Assessment: Patient presents with signs and symptoms consistent with pain due to T6 compression fracture, resulting in limited participation in pain-free ADLs and inability to perform at their prior level of  function. The patient is presenting with decreased core and scapular stability as well as decreased B UE/LE strength, ROM, flexibility, and posture. Skilled PT warranted to address the above stated impairments, so the patient can perform FA's without increased pain or difficulty.    PT Assessment Results: Decreased strength, Decreased range of motion, Impaired balance, Decreased endurance, Decreased mobility  Rehab Prognosis: Good    Clinical Presentation: Stable and/or uncomplicated characteristics    Complexity: Low    Plan:  Treatment/Interventions: Cryotherapy, Education/ Instruction, Electrical stimulation, Hot pack, Manual therapy, Neuromuscular re-education, Therapeutic activities, Therapeutic exercises  PT Plan: Skilled PT  PT Frequency: 2 times per week  Duration: 5 weeks  Onset Date: 11/01/23  Certification Period Start Date: 11/12/24  Certification Period End Date:  (requires auth)  Number of Treatments Authorized: 1 of 1- auth required  Rehab Potential: Good  Plan of Care Agreement: Patient    Goals: Set and discussed today  Active       PT Problem       Patient will demonstrate WFL L/S AROM to improve her back mobility with FA's.       Start:  11/12/24    Expected End:  01/07/25            Patient will be independent with HEP.        Start:  11/12/24    Expected End:  11/26/24            Patient will demonstrate 5/5 strength with B shoulder MMT to improve her ability to lift, reach, carry, etc at work.        Start:  11/12/24    Expected End:  01/07/25            Patient will demonstrate an improvement of at least 5 degrees of cervical AROM all directions to improve her ability to perform ADL's and FA's without difficulty.        Start:  11/12/24    Expected End:  12/24/24            Patient will report no >/= 2/10 pain with standing and walking up to an hour at work.        Start:  11/12/24    Expected End:  01/07/25            Patient will score </= 4% on modified GLORIA to improve her ability to stand,  lift, etc in order to perform work duties without difficulty.        Start:  11/12/24    Expected End:  01/07/25                Plan of care was developed with input and agreement by the patient      Treatment Performed:  Access Code: [email or web address]  URL: [email or web address]  Date: 11/12/2024  Prepared by: Ольга Hendrix    Exercises  - Seated Scapular Retraction  - 1-2 x daily - 7 x weekly - 1 sets - 10 reps  - Seated Cervical Retraction  - 1-2 x daily - 7 x weekly - 1 sets - 10 reps - 3 second hold  - Plank with Thoracic Rotation on Counter  - 1-2 x daily - 7 x weekly - 1 sets - 10 reps - 3 second hold  - Standing Lower Cervical and Upper Thoracic Stretch  - 1-2 x daily - 7 x weekly - 1 sets - 5 reps - 10 second hold    Ambulatory Screenings Summary       Screening  Frequency  Date Last Completed   Spiritual and Cultural Beliefs   Screening  each visit or episode of care 11/12/2024   Falls Risk Screening  every ambulatory visit 11/12/2024   Pain Screening  annually at primary care visit  11/1/2024   Domestic Violence screening  annually at primary care visit 11/1/2024   Elder Abuse Screening  annually at primary care visit 11/12/2024   Depression Screening  annually in the primary care setting 8/14/2024   Suicide Risk Screening  annually in the primary care setting 11/1/2024   Nutrition and Food Insecurity   Screening  at least annually at primary care visit  11/12/2024   Key Learner  annually in the primary care setting 11/12/2024   Drug Screen  11/1/2024  9:57 AM   Alcohol Screen  11/1/2024  9:57 AM   Advance Directive  2/22/2024       Ольга Hendrix, PT

## 2024-11-19 ENCOUNTER — TREATMENT (OUTPATIENT)
Dept: PHYSICAL THERAPY | Facility: HOSPITAL | Age: 69
End: 2024-11-19
Payer: MEDICARE

## 2024-11-19 DIAGNOSIS — M54.6 THORACIC BACK PAIN: ICD-10-CM

## 2024-11-19 DIAGNOSIS — M48.54XA: ICD-10-CM

## 2024-11-19 PROCEDURE — 97110 THERAPEUTIC EXERCISES: CPT | Mod: GP,CQ

## 2024-11-19 ASSESSMENT — PAIN SCALES - GENERAL: PAINLEVEL_OUTOF10: 0 - NO PAIN

## 2024-11-19 ASSESSMENT — PAIN - FUNCTIONAL ASSESSMENT: PAIN_FUNCTIONAL_ASSESSMENT: 0-10

## 2024-11-19 NOTE — PROGRESS NOTES
Physical Therapy Treatment    Patient Name: Nadege Gillette  MRN: [id number]  Today's Date: 11/19/2024  Time Calculation  Start Time: 1439  Stop Time: 1525  Time Calculation (min): 46 min        PT Therapeutic Procedures Time Entry  Therapeutic Exercise Time Entry: 46                Current Problem  1. Non-traumatic compression fracture of sixth thoracic vertebra (Multi)  Follow Up In Physical Therapy      2. Thoracic back pain  Follow Up In Physical Therapy          General  Reason for Referral: T6 Compression Fracture  Referred By: LIUDMILA Cardenas-CNP  Past Medical History Relevant to Rehab: A-Fib, HTN, Osteoporosis  Preferred Learning Style: verbal, visual, written    Subjective   Current Condition:   Same  Patient reports her pain gets worse with work, and once it starts she is down for a couple days. She is unable to sleep.    Performing HEP?: Yes    Precautions  Precautions  STEADI Fall Risk Score (The score of 4 or more indicates an increased risk of falling): 2  Medical Precautions: Fall precautions, Cardiac precautions    Pain  Pain Assessment: 0-10  0-10 (Numeric) Pain Score: 0 - No pain    Objective   Shoulder  Observation   Pt. Presents with tight cervical, thoracic muscles due to muscle guarding when she has pain.  Cervical AROM   Limited in lat flex, and slightly in rotation  Treatments:    Therapeutic Exercise  Therapeutic Exercise Performed: Yes  Therapeutic Exercise Activity 1: ergonometer LV 1 x4 min. 2 fwd/ 2 back  Therapeutic Exercise Activity 2: scap retract. x 15 red  Therapeutic Exercise Activity 3: shoulder ext red x 15  Therapeutic Exercise Activity 4: thoracic ext. stretch seated 5 sec. x 5  Therapeutic Exercise Activity 5: thoracic flex stretch flexed fwd 10 sec. x 5  Therapeutic Exercise Activity 6: seated sidebend 10 sec. x 5 ea.  Therapeutic Exercise Activity 7: post capsule stretch with elbow extended  Therapeutic Exercise Activity 8: SL open book x 5 ea.  Therapeutic Exercise  Activity 9: PPT x 10  Therapeutic Exercise Activity 10: LTR x 10  Therapeutic Exercise Activity 11: cat/camel 5 sec. x 5  Therapeutic Exercise Activity 12: supine chin tuck x 5    EDUCATION:   Outpatient Education  Individual(s) Educated: Patient  Education Provided: Home Exercise Program (and how to modify to decrease pain)  Patient/Caregiver Demonstrated Understanding: yes    Assessment: Pt. Able to tolerate all stretching and strengthening and we discussed exercise modifications and techniques for better results. Pt reported no pain at conclusion, and she was given an updated HEP.  PT Assessment  PT Assessment Results: Decreased strength, Decreased range of motion, Impaired balance, Decreased endurance, Decreased mobility  Rehab Prognosis: Good  Evaluation/Treatment Tolerance: Patient tolerated treatment well    Plan: continue with PT POC with focus on core and scapular strengthening and stretching of tight muscles, also ergonomics at work to avoid pain increase.  OP PT Plan  Treatment/Interventions: Cryotherapy, Education/ Instruction, Electrical stimulation, Hot pack, Manual therapy, Neuromuscular re-education, Therapeutic activities, Therapeutic exercises  PT Plan: Skilled PT  PT Frequency: 2 times per week  Duration: 5 weeks  Onset Date: 11/01/23  Certification Period Start Date: 11/12/24  Number of Treatments Authorized: 2 of 1- auth required  Rehab Potential: Good  Plan of Care Agreement: Patient    Goals:  Active       PT Problem       Patient will demonstrate WFL L/S AROM to improve her back mobility with FA's.       Start:  11/12/24    Expected End:  01/07/25            Patient will be independent with HEP.        Start:  11/12/24    Expected End:  11/26/24            Patient will demonstrate 5/5 strength with B shoulder MMT to improve her ability to lift, reach, carry, etc at work.        Start:  11/12/24    Expected End:  01/07/25            Patient will demonstrate an improvement of at least 5 degrees  of cervical AROM all directions to improve her ability to perform ADL's and FA's without difficulty.        Start:  11/12/24    Expected End:  12/24/24            Patient will report no >/= 2/10 pain with standing and walking up to an hour at work.        Start:  11/12/24    Expected End:  01/07/25            Patient will score </= 4% on modified GLORIA to improve her ability to stand, lift, etc in order to perform work duties without difficulty.        Start:  11/12/24    Expected End:  01/07/25                 Renee Quinteros, PTA

## 2024-11-21 ENCOUNTER — TREATMENT (OUTPATIENT)
Dept: PHYSICAL THERAPY | Facility: HOSPITAL | Age: 69
End: 2024-11-21
Payer: MEDICARE

## 2024-11-21 DIAGNOSIS — M54.6 THORACIC BACK PAIN: ICD-10-CM

## 2024-11-21 DIAGNOSIS — M48.54XA: ICD-10-CM

## 2024-11-21 PROCEDURE — 97140 MANUAL THERAPY 1/> REGIONS: CPT | Mod: GP,CQ

## 2024-11-21 PROCEDURE — 97110 THERAPEUTIC EXERCISES: CPT | Mod: GP,CQ

## 2024-11-21 ASSESSMENT — PAIN SCALES - GENERAL: PAINLEVEL_OUTOF10: 2

## 2024-11-21 ASSESSMENT — PAIN - FUNCTIONAL ASSESSMENT: PAIN_FUNCTIONAL_ASSESSMENT: 0-10

## 2024-11-21 NOTE — PROGRESS NOTES
Physical Therapy Treatment    Patient Name: Nadege Gillette  MRN: [id number]  Today's Date: 11/21/2024  Time Calculation  Start Time: 1435  Stop Time: 1530  Time Calculation (min): 55 min        PT Therapeutic Procedures Time Entry  Manual Therapy Time Entry: 11  Therapeutic Exercise Time Entry: 44                Current Problem  1. Non-traumatic compression fracture of sixth thoracic vertebra (Multi)  Follow Up In Physical Therapy      2. Thoracic back pain  Follow Up In Physical Therapy          General  Reason for Referral: T6 Compression Fracture  Referred By: ESTEFANY Cardenas  Past Medical History Relevant to Rehab: A-Fib, HTN, Osteoporosis  Preferred Learning Style: verbal, visual, written    Subjective   Current Condition:   Better  Patient reports she has no pain some of the time, but when her back acts up it gets bad. Today she has minor soreness at 2/10    Performing HEP?: Yes    Precautions  Precautions  STEADI Fall Risk Score (The score of 4 or more indicates an increased risk of falling): 2  Medical Precautions: Fall precautions, Cardiac precautions    Pain  Pain Assessment: 0-10  0-10 (Numeric) Pain Score: 2    Objective   Lumbar Spine  Observation   0/10 pain at conclusion of therapy, pt. Able to perform cat/cow with more ease  Lumbar Palpation/Joint Mobility Assessment   Pain with palpation L thoracic paraspinals  Lumbar AROM   Thoracic and lumbar rotation and flexion increasing, getting easier to stretch without pain  Treatments:    Therapeutic Exercise  Therapeutic Exercise Performed: Yes  Therapeutic Exercise Activity 1: ergonometer LV 1 x4 min. 2 fwd/ 2 back  Therapeutic Exercise Activity 2: scap retract. x 15 red  Therapeutic Exercise Activity 3: shoulder ext red x 15  Therapeutic Exercise Activity 4: thoracic ext. stretch seated 5 sec. x 5  Therapeutic Exercise Activity 5: thoracic flex stretch flexed fwd 10 sec. x 5  Therapeutic Exercise Activity 6: thoracic flex stretch flexed fwd 10  sec. x 5  Therapeutic Exercise Activity 7: post capsule stretch with elbow extended  Therapeutic Exercise Activity 8: SL open book x 5 ea.  Therapeutic Exercise Activity 9: PPT x 10  Therapeutic Exercise Activity 10: LTR x 10  Therapeutic Exercise Activity 11: cat/camel 5 sec. x 5  Therapeutic Exercise Activity 12: supine chin tuck x 5  Therapeutic Exercise Activity 13: prone shoulder flex x 5  Therapeutic Exercise Activity 14: prone opposit UE/LEflex/ext    Manual Therapy  Manual Therapy Performed: Yes  Manual Therapy Activity 1: IASTM to thoracic paraspinals for  pain control and to improve flexability    Assessment: Pt. Able to complete all exercises without c/o increased pain and tolerated added prone sub scap strengthening. Manual therap on L thoracic paraspinals decreased muscle tension and guarding.   PT Assessment  PT Assessment Results: Decreased strength, Decreased range of motion, Impaired balance, Decreased endurance, Decreased mobility  Rehab Prognosis: Good  Evaluation/Treatment Tolerance: Patient tolerated treatment well    Plan:continue with PT POC with focus on increasing ROM and strength in thoracic area.  OP PT Plan  Treatment/Interventions: Cryotherapy, Education/ Instruction, Electrical stimulation, Hot pack, Manual therapy, Neuromuscular re-education, Therapeutic activities, Therapeutic exercises  PT Plan: Skilled PT  PT Frequency: 2 times per week  Duration: 5 weeks  Onset Date: 11/01/23  Certification Period Start Date: 11/12/24  Number of Treatments Authorized: 3 of  auth required  Rehab Potential: Good  Plan of Care Agreement: Patient    Goals:  Active       PT Problem       Patient will demonstrate WFL L/S AROM to improve her back mobility with FA's.       Start:  11/12/24    Expected End:  01/07/25            Patient will be independent with HEP.        Start:  11/12/24    Expected End:  11/26/24            Patient will demonstrate 5/5 strength with B shoulder MMT to improve her ability  to lift, reach, carry, etc at work.        Start:  11/12/24    Expected End:  01/07/25            Patient will demonstrate an improvement of at least 5 degrees of cervical AROM all directions to improve her ability to perform ADL's and FA's without difficulty.        Start:  11/12/24    Expected End:  12/24/24            Patient will report no >/= 2/10 pain with standing and walking up to an hour at work.        Start:  11/12/24    Expected End:  01/07/25            Patient will score </= 4% on modified GLORIA to improve her ability to stand, lift, etc in order to perform work duties without difficulty.        Start:  11/12/24    Expected End:  01/07/25                 Renee Quinteros, PTA

## 2024-11-26 ENCOUNTER — TREATMENT (OUTPATIENT)
Dept: PHYSICAL THERAPY | Facility: HOSPITAL | Age: 69
End: 2024-11-26
Payer: MEDICARE

## 2024-11-26 DIAGNOSIS — M48.54XA: ICD-10-CM

## 2024-11-26 DIAGNOSIS — M54.6 THORACIC BACK PAIN: ICD-10-CM

## 2024-11-26 PROCEDURE — RXMED WILLOW AMBULATORY MEDICATION CHARGE

## 2024-11-26 PROCEDURE — 97110 THERAPEUTIC EXERCISES: CPT | Mod: GP,CQ

## 2024-11-26 ASSESSMENT — PAIN - FUNCTIONAL ASSESSMENT: PAIN_FUNCTIONAL_ASSESSMENT: 0-10

## 2024-11-26 ASSESSMENT — PAIN SCALES - GENERAL: PAINLEVEL_OUTOF10: 2

## 2024-11-26 NOTE — PROGRESS NOTES
Physical Therapy Treatment    Patient Name: Nadege Gillette  MRN: [id number]  Today's Date: 11/26/2024  Time Calculation  Start Time: 1520  Stop Time: 1610  Time Calculation (min): 50 min        PT Therapeutic Procedures Time Entry  Therapeutic Exercise Time Entry: 50                Current Problem  1. Non-traumatic compression fracture of sixth thoracic vertebra (Multi)  Follow Up In Physical Therapy      2. Thoracic back pain  Follow Up In Physical Therapy          General  Reason for Referral: T6 Compression Fracture  Referred By: ESTEFANY Cardenas  Past Medical History Relevant to Rehab: A-Fib, HTN, Osteoporosis  Preferred Learning Style: verbal, visual, written    Subjective   Current Condition:   Better  Patient reports she is feeling better, she is less limited in the activities she can do.    Performing HEP?: Yes    Precautions  Precautions  STEADI Fall Risk Score (The score of 4 or more indicates an increased risk of falling): 2  Medical Precautions: Fall precautions, Cardiac precautions    Pain  Pain Assessment: 0-10  0-10 (Numeric) Pain Score: 2    Objective   Lumbar Spine  Lumbar Palpation/Joint Mobility Assessment   No pain today  Lumbar AROM   WNL  Treatments:    Therapeutic Exercise  Therapeutic Exercise Performed: Yes  Therapeutic Exercise Activity 1: ergonometer LV 2 x4 min. 2 fwd/ 2 back  Therapeutic Exercise Activity 2: scap retract. x 20 green  Therapeutic Exercise Activity 3: shoulder ext green x 20  Therapeutic Exercise Activity 4: thoracic ext. stretch seated 10 sec. x 5  Therapeutic Exercise Activity 5: upper trap stretch 20 sec. x 2 ea.  Therapeutic Exercise Activity 6: seated sidebend 10 sec. x 5 ea.  Therapeutic Exercise Activity 7: post capsule stretch with elbow extended  Therapeutic Exercise Activity 8: SL open book x 5 ea.  Therapeutic Exercise Activity 9: PPT x 10  Therapeutic Exercise Activity 10: LTR x 10  Therapeutic Exercise Activity 11: seated flexion stretch 10 sec. x  5  Therapeutic Exercise Activity 12: supine chin tuck 5 sec. x 10  Therapeutic Exercise Activity 13: prone shoulder flex x 10  Therapeutic Exercise Activity 14: prone opposit UE/LEflex/ext x 5    Manual Therapy  Manual Therapy Performed: No  Assessment: Pt. Able to complete all exercises without difficulty. Pt reported no pain at end of therapy, and was able to perform multiple stabilization exercises without c/o thoracic pain. Will continue to advance as tolerated.  PT Assessment  PT Assessment Results: Decreased strength, Decreased range of motion, Impaired balance, Decreased endurance, Decreased mobility  Rehab Prognosis: Good  Evaluation/Treatment Tolerance: Patient tolerated treatment well    Plan: continue with PT POC with focus on stretching of tight musculature and strengthening of core and Les for improved activity tolerance and decreased pain.  OP PT Plan  Treatment/Interventions: Cryotherapy, Education/ Instruction, Electrical stimulation, Hot pack, Manual therapy, Neuromuscular re-education, Therapeutic activities, Therapeutic exercises  PT Plan: Skilled PT  PT Frequency: 2 times per week  Duration: 5 weeks  Onset Date: 11/01/23  Certification Period Start Date: 11/12/24  Number of Treatments Authorized: 4 of auth required  Rehab Potential: Good  Plan of Care Agreement: Patient    Goals:  Active       PT Problem       Patient will demonstrate WFL L/S AROM to improve her back mobility with FA's.       Start:  11/12/24    Expected End:  01/07/25            Patient will be independent with HEP.        Start:  11/12/24    Expected End:  11/26/24            Patient will demonstrate 5/5 strength with B shoulder MMT to improve her ability to lift, reach, carry, etc at work.        Start:  11/12/24    Expected End:  01/07/25            Patient will demonstrate an improvement of at least 5 degrees of cervical AROM all directions to improve her ability to perform ADL's and FA's without difficulty.        Start:   11/12/24    Expected End:  12/24/24            Patient will report no >/= 2/10 pain with standing and walking up to an hour at work.        Start:  11/12/24    Expected End:  01/07/25            Patient will score </= 4% on modified GLORIA to improve her ability to stand, lift, etc in order to perform work duties without difficulty.        Start:  11/12/24    Expected End:  01/07/25                 Renee Quinteros, PTA

## 2024-11-27 ENCOUNTER — PHARMACY VISIT (OUTPATIENT)
Dept: PHARMACY | Facility: CLINIC | Age: 69
End: 2024-11-27
Payer: COMMERCIAL

## 2024-12-03 ENCOUNTER — TREATMENT (OUTPATIENT)
Dept: PHYSICAL THERAPY | Facility: HOSPITAL | Age: 69
End: 2024-12-03
Payer: MEDICARE

## 2024-12-03 DIAGNOSIS — M48.54XA: ICD-10-CM

## 2024-12-03 DIAGNOSIS — M54.6 THORACIC BACK PAIN: ICD-10-CM

## 2024-12-03 PROCEDURE — 97140 MANUAL THERAPY 1/> REGIONS: CPT | Mod: GP,CQ

## 2024-12-03 PROCEDURE — 97110 THERAPEUTIC EXERCISES: CPT | Mod: GP,CQ

## 2024-12-03 PROCEDURE — 97112 NEUROMUSCULAR REEDUCATION: CPT | Mod: GP,CQ

## 2024-12-03 ASSESSMENT — PAIN - FUNCTIONAL ASSESSMENT: PAIN_FUNCTIONAL_ASSESSMENT: 0-10

## 2024-12-03 ASSESSMENT — PAIN SCALES - GENERAL: PAINLEVEL_OUTOF10: 2

## 2024-12-03 NOTE — PROGRESS NOTES
Physical Therapy Treatment    Patient Name: Nadege Gillette  MRN: [id number]  Today's Date: 12/3/2024  Time Calculation  Start Time: 1350  Stop Time: 1450  Time Calculation (min): 60 min        PT Therapeutic Procedures Time Entry  Manual Therapy Time Entry: 12  Neuromuscular Re-Education Time Entry: 10  Therapeutic Exercise Time Entry: 38                Current Problem  1. Non-traumatic compression fracture of sixth thoracic vertebra (Multi)  Follow Up In Physical Therapy      2. Thoracic back pain  Follow Up In Physical Therapy          General  Reason for Referral: T6 Compression Fracture  Referred By: ESTEFANY Cardenas  Past Medical History Relevant to Rehab: A-Fib, HTN, Osteoporosis  Preferred Learning Style: verbal, visual, written  General Comment: pt. feels she is able to do more with less pain now.    Subjective   Current Condition:   Same  Patient reports  pt. Reports she does not have pain past 2/10 most of the time and she is able to do more at home with less pain.    Performing HEP?: Yes    Precautions  Precautions  STEADI Fall Risk Score (The score of 4 or more indicates an increased risk of falling): 2  Medical Precautions: Fall precautions, Cardiac precautions    Pain  Pain Assessment: 0-10  0-10 (Numeric) Pain Score: 2    Objective   Lumbar Spine  Observation   Pain level to 0/10 at end of therapy  Lumbar Palpation/Joint Mobility Assessment   Pain with palpation L med latissimus dorsi  Lumbar AROM   Thoracic flex/ext, and rotation are improving  Hip AROM   WNL  Treatments:    Therapeutic Exercise  Therapeutic Exercise Performed: Yes  Therapeutic Exercise Activity 1: ergonometer LV 3 x4 min. 2 fwd/ 2 back  Therapeutic Exercise Activity 2: cat/cow x 10  Therapeutic Exercise Activity 4: thoracic ext. stretch seated 5 sec. x 10 against half bolster  Therapeutic Exercise Activity 5: upper trap stretch 20 sec. x 2 ea.  Therapeutic Exercise Activity 6: seated sidebend 10 sec. x 5 ea.  Therapeutic  Exercise Activity 7: post capsule stretch with elbow extended x 3 ea.  Therapeutic Exercise Activity 8: SL open book x 5 ea.  Therapeutic Exercise Activity 9: PPT x 10  Therapeutic Exercise Activity 10: LTR x 10  Therapeutic Exercise Activity 11: seated flexion stretch 10 sec. x 5  Therapeutic Exercise Activity 12: supine chin tuck 5 sec. x 10  Therapeutic Exercise Activity 13: prone shoulder flex x 10  Therapeutic Exercise Activity 14: prone opposit UE/LEflex/ext x 5    Balance/Neuromuscular Re-Education  Balance/Neuromuscular Re-Education Activity Performed: Yes  Balance/Neuromuscular Re-Education Activity 1: lunge with B shoulder flex on bosu  Balance/Neuromuscular Re-Education Activity 2: scap retract on cushion x 20 green  Balance/Neuromuscular Re-Education Activity 3: shoulder ext standing on cushion , green x 20    Manual Therapy  Manual Therapy Performed: Yes  Manual Therapy Activity 1: IASTM to thoracic paraspinals for pain control and to improve flexability    Assessment: Pt. Able to complete exercises without c/o pain and is getting stronger with less difficulty performing advanced scap strengthening. Will advance as tolerated.  PT Assessment  PT Assessment Results: Decreased strength, Decreased range of motion, Impaired balance, Decreased endurance, Decreased mobility  Rehab Prognosis: Good  Evaluation/Treatment Tolerance: Patient tolerated treatment well    Plan: continue with PT POC with focus on strengthening of core and LES and stretching of tight musculature along with postural correction/ stengthening  OP PT Plan  Treatment/Interventions: Cryotherapy, Education/ Instruction, Electrical stimulation, Hot pack, Manual therapy, Neuromuscular re-education, Therapeutic activities, Therapeutic exercises  PT Plan: Skilled PT  PT Frequency: 2 times per week  Duration: 5 weeks  Onset Date: 11/01/23  Certification Period Start Date: 11/12/24  Number of Treatments Authorized: 5 of auth required  Rehab  Potential: Good  Plan of Care Agreement: Patient    Goals:  Active       PT Problem       Patient will demonstrate WFL L/S AROM to improve her back mobility with FA's.       Start:  11/12/24    Expected End:  01/07/25            Patient will be independent with HEP.        Start:  11/12/24    Expected End:  11/26/24            Patient will demonstrate 5/5 strength with B shoulder MMT to improve her ability to lift, reach, carry, etc at work.        Start:  11/12/24    Expected End:  01/07/25            Patient will demonstrate an improvement of at least 5 degrees of cervical AROM all directions to improve her ability to perform ADL's and FA's without difficulty.        Start:  11/12/24    Expected End:  12/24/24            Patient will report no >/= 2/10 pain with standing and walking up to an hour at work.        Start:  11/12/24    Expected End:  01/07/25            Patient will score </= 4% on modified GLORIA to improve her ability to stand, lift, etc in order to perform work duties without difficulty.        Start:  11/12/24    Expected End:  01/07/25                 Renee Quinteros, PTA

## 2024-12-05 ENCOUNTER — APPOINTMENT (OUTPATIENT)
Dept: PHYSICAL THERAPY | Facility: HOSPITAL | Age: 69
End: 2024-12-05
Payer: MEDICARE

## 2024-12-10 ENCOUNTER — TREATMENT (OUTPATIENT)
Dept: PHYSICAL THERAPY | Facility: HOSPITAL | Age: 69
End: 2024-12-10
Payer: MEDICARE

## 2024-12-10 DIAGNOSIS — M54.6 THORACIC BACK PAIN: Primary | ICD-10-CM

## 2024-12-10 DIAGNOSIS — M48.54XA: ICD-10-CM

## 2024-12-10 PROCEDURE — 97140 MANUAL THERAPY 1/> REGIONS: CPT | Mod: GP | Performed by: PHYSICAL THERAPIST

## 2024-12-10 PROCEDURE — 97110 THERAPEUTIC EXERCISES: CPT | Mod: GP | Performed by: PHYSICAL THERAPIST

## 2024-12-10 ASSESSMENT — PAIN SCALES - GENERAL: PAINLEVEL_OUTOF10: 2

## 2024-12-10 ASSESSMENT — PAIN DESCRIPTION - DESCRIPTORS: DESCRIPTORS: ACHING

## 2024-12-10 ASSESSMENT — PAIN - FUNCTIONAL ASSESSMENT: PAIN_FUNCTIONAL_ASSESSMENT: 0-10

## 2024-12-10 NOTE — PROGRESS NOTES
Physical Therapy Discharge and Treatment    Patient Name: Nadege Gillette  MRN: [id number]  Encounter Date: 12/10/2024  Time Calculation  Start Time: 1352  Stop Time: 1432  Time Calculation (min): 40 min        PT Therapeutic Procedures Time Entry  Manual Therapy Time Entry: 10  Therapeutic Exercise Time Entry: 30      Current Problem  Problem List Items Addressed This Visit             ICD-10-CM    Non-traumatic compression fracture of sixth thoracic vertebra (Multi) M48.54XA    Thoracic back pain - Primary M54.6     1. Thoracic back pain  Follow Up In Physical Therapy      2. Non-traumatic compression fracture of sixth thoracic vertebra (Multi)  Follow Up In Physical Therapy          General  Reason for Referral: T6 Compression Fracture  Referred By: ESTEFANY Cardenas  Past Medical History Relevant to Rehab: A-Fib, HTN, Osteoporosis  Preferred Learning Style: verbal, visual, written  General Comment: pt. feels she is able to do more with less pain now.    Subjective   Current Condition:   Better  Patient reports that she worked both Friday and Saturday. She had some pain but not like it was before. She can tell she is making good progress and feels ready to be discharged to home program today. The patient feels like her pain is easily managed with her exercises.      Performing HEP?: Yes    Precautions  Precautions  Medical Precautions: Fall precautions, Cardiac precautions  Pain  Pain Assessment: 0-10  0-10 (Numeric) Pain Score: 2  Pain Location: Back  Pain Descriptors: Aching    Objective        ROM  Cervical AROM (Degrees)     Flexion: 40  Extension: 40  (L) Side Bend: 23  (R) Side Bend: 27  (L) Rotation: 65  (R) Rotation: 70        Shoulder AROM (Degrees)                             (R)                    (L)  Flexion:            4+/5                 4+/5       Abduction:       4+/5                 4+/5                   ER at 45:           4/5                   4/5                                 IR at  45:           4+/5                 4+/5                      Lumbar AROM (%)     Flexion: 100  Extension: 75  (L) Side Bend: 100  (R) Side Bend: 100  (L) Rotation: 100  (R) Rotation: 100        Hip AROM (Degrees)                             (R)                    (L)  Flexion:            WFL                  WFL        ER:                    32                     30                       IR:                     20                     20                                                     Strength Testing     Core/Abdominals: Good TA isometric     Shoulder                           (R)                    (L)  Flexion:            5/5                 4+/5       Abduction:       5/5                   5/5                   ER at 45:           4+/5                 4+/5                                 IR at 45:           4+/5                 4+/5     Hip                             (R)                    (L)  Flexion:            5/5                   5/5                                 Extension:        4+/5                 4/5                     Abduction:       4+/5                 4+/5                   Adduction:       4+/5                 4+/5                                  Knee                          (R)                    (L)  Flexion:            4+/5                 4+/5                                           Extension:        5/5                   5/5            Ankle                          (R)                    (L)  Plantarflexion:  5/5                   5/5                                             Dorsiflexion:     5/5                   5/5                                                                                      Functional Screening     Lower Extremity Functional Movements  Transfers: Independent  Gait: non-antalgic  Assistive Device: no device     Palpation: (+) mild TTP L thoracic paraspinals    Flexibility: Min. Restrict. B pecs     Posture: shoulder rounded forward, head forward,  "and thoracic scoliosis(mild)    Treatments:    Therapeutic Exercise  Therapeutic Exercise Performed: Yes  Therapeutic Exercise Activity 1: ergonometer LV 3 x4 min. 2 fwd/ 2 back  Therapeutic Exercise Activity 2: Row blue band 2 x 10  Therapeutic Exercise Activity 3: Shoulder extension blue band 2 x 10  Therapeutic Exercise Activity 4: B shoulder ER red band 2 x 10  Therapeutic Exercise Activity 5: Supine pec stretch on towel roll 2 x 30\"  Therapeutic Exercise Activity 6: SL open book x 10 ea.  Therapeutic Exercise Activity 7: PPT x 10  Therapeutic Exercise Activity 8: LTR x 10    Manual Therapy  Manual Therapy Performed: Yes  Manual Therapy Activity 1: IASTM to thoracic paraspinals for pain control and to improve flexability      EDUCATION:   Individual(s) Educated: Patient  Education Provided: POC  Handout(s) Provided: Scanned into chart  Home Program: Continue HEP  Risk and Benefits Discussed with Patient/Caregiver/Other: Yes   Patient/Caregiver Demonstrated Understanding: Yes   Patient Response to Education: Patient/Caregiver verbalized understanding of information, Patient/Caregiver performed return demonstration of exercises/activities, and Patient/Caregiver asked appropriate questions  Access Code: [email or web address]  URL: [email or web address]  Date: 12/10/2024  Prepared by: Ольга Hendrix    Exercises  - Shoulder External Rotation and Scapular Retraction with Resistance  - 1 x daily - 7 x weekly - 2 sets - 10 reps  - Shoulder extension with resistance - Neutral  - 1 x daily - 7 x weekly - 2 sets - 10 reps  - Standing Shoulder Row with Anchored Resistance  - 1 x daily - 7 x weekly - 2 sets - 10 reps    Assessment:   The patient is making good progress towards her goals. She has made UE/LE strength gains as well as core stability, posture, and tissue mobility. The patient is able to stand, ambulate, and sit long periods of time without increased pain or difficulty. Patient requested to be discharged " from therapy as she is feeling significantly better and can manage her pain with her HEP.  PT Assessment  Rehab Prognosis: Good    Plan: Discharge to home program today  OP PT Plan  PT Plan: No Additional PT interventions required at this time  Onset Date: 11/01/23  Certification Period Start Date: 11/12/24  Certification Period End Date: 12/17/24  Number of Treatments Authorized: 6 of 10  Rehab Potential: Good  Plan of Care Agreement: Patient  Payor: [employer] / Plan: [employer]IC[employer] / Product Type: *No Product type* /     Visit count this episode: 6 visits    Goals:  Active       PT Problem       Patient will demonstrate WFL L/S AROM to improve her back mobility with FA's. (Met)       Start:  11/12/24    Expected End:  01/07/25    Resolved:  12/10/24         Patient will be independent with HEP.  (Met)       Start:  11/12/24    Expected End:  11/26/24    Resolved:  12/10/24         Patient will demonstrate 5/5 strength with B shoulder MMT to improve her ability to lift, reach, carry, etc at work.  (Progressing)       Start:  11/12/24    Expected End:  01/07/25            Patient will demonstrate an improvement of at least 5 degrees of cervical AROM all directions to improve her ability to perform ADL's and FA's without difficulty.  (Met)       Start:  11/12/24    Expected End:  12/24/24    Resolved:  12/10/24         Patient will report no >/= 2/10 pain with standing and walking up to an hour at work.  (Met)       Start:  11/12/24    Expected End:  01/07/25    Resolved:  12/10/24         Patient will score </= 4% on modified GLORIA to improve her ability to stand, lift, etc in order to perform work duties without difficulty.  (Progressing)       Start:  11/12/24    Expected End:  01/07/25                 Ольга Hendrix, PT

## 2024-12-12 ENCOUNTER — APPOINTMENT (OUTPATIENT)
Dept: PHYSICAL THERAPY | Facility: HOSPITAL | Age: 69
End: 2024-12-12
Payer: MEDICARE

## 2025-01-08 PROCEDURE — RXMED WILLOW AMBULATORY MEDICATION CHARGE

## 2025-01-10 ENCOUNTER — APPOINTMENT (OUTPATIENT)
Dept: PAIN MEDICINE | Facility: HOSPITAL | Age: 70
End: 2025-01-10
Payer: MEDICARE

## 2025-01-13 ENCOUNTER — PHARMACY VISIT (OUTPATIENT)
Dept: PHARMACY | Facility: CLINIC | Age: 70
End: 2025-01-13
Payer: COMMERCIAL

## 2025-01-13 PROCEDURE — RXMED WILLOW AMBULATORY MEDICATION CHARGE

## 2025-01-14 ENCOUNTER — APPOINTMENT (OUTPATIENT)
Dept: PRIMARY CARE | Facility: CLINIC | Age: 70
End: 2025-01-14
Payer: MEDICARE

## 2025-01-14 VITALS
HEIGHT: 65 IN | SYSTOLIC BLOOD PRESSURE: 122 MMHG | HEART RATE: 68 BPM | DIASTOLIC BLOOD PRESSURE: 64 MMHG | TEMPERATURE: 97.3 F | OXYGEN SATURATION: 96 % | WEIGHT: 157 LBS | BODY MASS INDEX: 26.16 KG/M2

## 2025-01-14 DIAGNOSIS — E03.9 HYPOTHYROIDISM, UNSPECIFIED TYPE: ICD-10-CM

## 2025-01-14 DIAGNOSIS — Z00.00 MEDICARE ANNUAL WELLNESS VISIT, SUBSEQUENT: Primary | ICD-10-CM

## 2025-01-14 DIAGNOSIS — I42.8 NON-ISCHEMIC CARDIOMYOPATHY (MULTI): ICD-10-CM

## 2025-01-14 DIAGNOSIS — I48.91 ATRIAL FIBRILLATION, UNSPECIFIED TYPE (MULTI): ICD-10-CM

## 2025-01-14 DIAGNOSIS — Z12.31 ENCOUNTER FOR SCREENING MAMMOGRAM FOR MALIGNANT NEOPLASM OF BREAST: ICD-10-CM

## 2025-01-14 PROCEDURE — 3074F SYST BP LT 130 MM HG: CPT | Performed by: FAMILY MEDICINE

## 2025-01-14 PROCEDURE — G0439 PPPS, SUBSEQ VISIT: HCPCS | Performed by: FAMILY MEDICINE

## 2025-01-14 PROCEDURE — 3078F DIAST BP <80 MM HG: CPT | Performed by: FAMILY MEDICINE

## 2025-01-14 PROCEDURE — 1036F TOBACCO NON-USER: CPT | Performed by: FAMILY MEDICINE

## 2025-01-14 PROCEDURE — 1159F MED LIST DOCD IN RCRD: CPT | Performed by: FAMILY MEDICINE

## 2025-01-14 PROCEDURE — 1170F FXNL STATUS ASSESSED: CPT | Performed by: FAMILY MEDICINE

## 2025-01-14 PROCEDURE — 3008F BODY MASS INDEX DOCD: CPT | Performed by: FAMILY MEDICINE

## 2025-01-14 PROCEDURE — 1124F ACP DISCUSS-NO DSCNMKR DOCD: CPT | Performed by: FAMILY MEDICINE

## 2025-01-14 ASSESSMENT — ACTIVITIES OF DAILY LIVING (ADL)
DOING_HOUSEWORK: INDEPENDENT
GROCERY_SHOPPING: INDEPENDENT
DRESSING: INDEPENDENT
BATHING: INDEPENDENT
MANAGING_FINANCES: INDEPENDENT
TAKING_MEDICATION: INDEPENDENT

## 2025-01-14 NOTE — PROGRESS NOTES
"Subjective   Reason for Visit: Nadege Gillette is an 69 y.o. female here for a Medicare Wellness visit.     Past Medical, Surgical, and Family History reviewed and updated in chart.    Reviewed all medications by prescribing practitioner or clinical pharmacist (such as prescriptions, OTCs, herbal therapies and supplements) and documented in the medical record.        HPI  Here for medicare annual visit  HTN under control with meds  Has hypothyroidism, compliant with levothyroxine  Has A-fib and cardiomyopathy, on xarelto and coreg, has seen cardiology in the past told to see PRN from now on.    Patient Care Team:  Rj Green MD as PCP - General  Rj Green MD as PCP - [employer] PCP  Evita Clement MD     Review of Systems  General: no fever  Eyes: no blurry vision  ENT: no sore throat, no ear pain  Resp: no cough, sob or wheezing  Cardio: no chest pain, no palpitations  Abd: no nausea/vomiting  : no dysuria, no increased urinary frequency    Objective   Vitals:  /64   Pulse 68   Temp 36.3 °C (97.3 °F)   Ht 1.651 m (5' 5\")   Wt 71.2 kg (157 lb)   SpO2 96%   BMI 26.13 kg/m²       Physical Exam  Gen: NAD, alert  Head: normocephalic/atraumatic  Eyes: conjunctivae normal  Ears: canals clear bilaterally, TM normal   Nose: external nose normal   Oropharynx: clear   Resp: Clear to auscultation  CVS: Regular rate and rhythm  Abdomen: soft, NT, ND  Ext: no edema, NT of lower extremities  Neuro: gait normal     Assessment & Plan  Encounter for screening mammogram for malignant neoplasm of breast    Orders:    BI mammo bilateral screening tomosynthesis; Future    Atrial fibrillation, unspecified type (Multi)  Continue current regimen       Non-ischemic cardiomyopathy (Multi)  Continue current regimen       Medicare annual wellness visit, subsequent         BMI 26.0-26.9,adult                   "

## 2025-01-14 NOTE — PATIENT INSTRUCTIONS
Starting January 2025, all patients require an appointment for lab work:   Please call: [phone] [facility] ([phone])  OR walk-in and register on Kiosk located at the lab.   You can also call [facility] for any lab billing related questions.

## 2025-01-16 DIAGNOSIS — E78.5 DYSLIPIDEMIA: ICD-10-CM

## 2025-01-17 PROCEDURE — RXMED WILLOW AMBULATORY MEDICATION CHARGE

## 2025-01-18 RX ORDER — ATORVASTATIN CALCIUM 20 MG/1
20 TABLET, FILM COATED ORAL DAILY
Qty: 90 TABLET | Refills: 3 | Status: SHIPPED | OUTPATIENT
Start: 2025-01-18 | End: 2026-01-18

## 2025-01-23 ENCOUNTER — PHARMACY VISIT (OUTPATIENT)
Dept: PHARMACY | Facility: CLINIC | Age: 70
End: 2025-01-23
Payer: COMMERCIAL

## 2025-01-30 ENCOUNTER — DOCUMENTATION (OUTPATIENT)
Dept: PHYSICAL THERAPY | Facility: HOSPITAL | Age: 70
End: 2025-01-30
Payer: MEDICARE

## 2025-01-30 NOTE — PROGRESS NOTES
Physical Therapy    Discharge Summary    Name: Nadege Gillette  MRN: [id number]  : 1955  Date: 23    Discharge Summary: PT    Discharge Information: Date of discharge 2025, Date of last visit 12/10/2024, Date of evaluation 2024, Number of attended visits 6, Referred by Taylor Austin, and Referred for Thoracic back pain    Therapy Summary: Patient presents to clinic with insidious onset of thoracic pain due to T6 compression fracture.     Discharge Status: The patient has met the majority of her PT goals. She is able to perform FA's and stand long periods at work without significant back pain.      Rehab Discharge Reason: Achieved all and/or the most significant goals(s)

## 2025-02-21 DIAGNOSIS — I48.91 ATRIAL FIBRILLATION, UNSPECIFIED TYPE (MULTI): ICD-10-CM

## 2025-02-21 DIAGNOSIS — I10 HYPERTENSION, BENIGN: ICD-10-CM

## 2025-02-21 PROCEDURE — RXMED WILLOW AMBULATORY MEDICATION CHARGE

## 2025-02-21 RX ORDER — CARVEDILOL 12.5 MG/1
12.5 TABLET ORAL 2 TIMES DAILY
Qty: 180 TABLET | Refills: 3 | Status: CANCELLED | OUTPATIENT
Start: 2025-02-21 | End: 2026-02-21

## 2025-02-21 RX ORDER — RIVAROXABAN 20 MG/1
20 TABLET, FILM COATED ORAL DAILY
Qty: 90 TABLET | Refills: 3 | Status: CANCELLED | OUTPATIENT
Start: 2025-02-21 | End: 2026-02-21

## 2025-02-24 DIAGNOSIS — I10 HYPERTENSION, BENIGN: ICD-10-CM

## 2025-02-24 DIAGNOSIS — I48.91 ATRIAL FIBRILLATION, UNSPECIFIED TYPE (MULTI): ICD-10-CM

## 2025-02-24 PROCEDURE — RXMED WILLOW AMBULATORY MEDICATION CHARGE

## 2025-02-24 RX ORDER — CARVEDILOL 12.5 MG/1
12.5 TABLET ORAL 2 TIMES DAILY
Qty: 180 TABLET | Refills: 3 | Status: SHIPPED | OUTPATIENT
Start: 2025-02-24 | End: 2026-02-24

## 2025-02-25 ENCOUNTER — PHARMACY VISIT (OUTPATIENT)
Dept: PHARMACY | Facility: CLINIC | Age: 70
End: 2025-02-25
Payer: COMMERCIAL

## 2025-03-21 ENCOUNTER — TELEMEDICINE (OUTPATIENT)
Dept: PRIMARY CARE | Facility: CLINIC | Age: 70
End: 2025-03-21
Payer: MEDICARE

## 2025-03-21 DIAGNOSIS — J20.8 ACUTE BRONCHITIS DUE TO OTHER SPECIFIED ORGANISMS: Primary | ICD-10-CM

## 2025-03-21 PROCEDURE — 99213 OFFICE O/P EST LOW 20 MIN: CPT | Performed by: FAMILY MEDICINE

## 2025-03-21 RX ORDER — AMOXICILLIN AND CLAVULANATE POTASSIUM 875; 125 MG/1; MG/1
875 TABLET, FILM COATED ORAL 2 TIMES DAILY
Qty: 20 TABLET | Refills: 0 | Status: SHIPPED | OUTPATIENT
Start: 2025-03-21 | End: 2025-03-31

## 2025-03-21 NOTE — PROGRESS NOTES
Subjective   Patient ID: Nadege Gillette is a 69 y.o. female who presents for cough    HPI  Virtual Visit    An interactive audio and video telecommunication system which permits real time communications between the patient (at the originating site) and provider (at the distant site) was utilized to provide this telehealth service.   Verbal consent was requested and obtained from Nadege Gillette on this date, 03/21/25 for a telehealth visit and the patient's location was confirmed at the time of the visit.    Has been having cough x 1 month, has wheezing at times, and getting sob with exertion. No LE edema. No nausea/vomiting/diarrhea. No sore throat or ear pain. Has sinus pressure and headaches. Been taking Theraflu and tylenol    Review of Systems  As per HPI    Vitals:   due to telehealth visit, vitals not obtained, patient did not have them available at the time of the visit       Objective   Physical Exam  Physical exam done virtually through the computer screen     Gen: NAD  eyes: conjunctivae normal   Nose: external nose normal   Resp: does not appear to be short of breath at present time, no audible wheezing    Assessment/Plan   Problem List Items Addressed This Visit    None  Visit Diagnoses       Acute bronchitis due to other specified organisms    -  Primary    Relevant Medications    amoxicillin-pot clavulanate (Augmentin) 875-125 mg tablet

## 2025-04-06 DIAGNOSIS — I10 HYPERTENSION, BENIGN: ICD-10-CM

## 2025-04-06 PROCEDURE — RXMED WILLOW AMBULATORY MEDICATION CHARGE

## 2025-04-06 RX ORDER — LOSARTAN POTASSIUM 25 MG/1
25 TABLET ORAL DAILY
Qty: 90 TABLET | Refills: 3 | Status: SHIPPED | OUTPATIENT
Start: 2025-04-06 | End: 2026-04-06

## 2025-04-07 PROCEDURE — RXMED WILLOW AMBULATORY MEDICATION CHARGE

## 2025-04-10 ENCOUNTER — PHARMACY VISIT (OUTPATIENT)
Dept: PHARMACY | Facility: CLINIC | Age: 70
End: 2025-04-10
Payer: COMMERCIAL

## 2025-05-23 ENCOUNTER — PHARMACY VISIT (OUTPATIENT)
Dept: PHARMACY | Facility: CLINIC | Age: 70
End: 2025-05-23
Payer: COMMERCIAL

## 2025-05-23 PROCEDURE — RXMED WILLOW AMBULATORY MEDICATION CHARGE

## 2025-07-08 LAB
ALBUMIN SERPL-MCNC: 4.1 G/DL (ref 3.6–5.1)
ALP SERPL-CCNC: 69 U/L (ref 37–153)
ALT SERPL-CCNC: 10 U/L (ref 6–29)
ANION GAP SERPL CALCULATED.4IONS-SCNC: 10 MMOL/L (CALC) (ref 7–17)
AST SERPL-CCNC: 15 U/L (ref 10–35)
BILIRUB SERPL-MCNC: 0.6 MG/DL (ref 0.2–1.2)
BUN SERPL-MCNC: 15 MG/DL (ref 7–25)
CALCIUM SERPL-MCNC: 9 MG/DL (ref 8.6–10.4)
CHLORIDE SERPL-SCNC: 106 MMOL/L (ref 98–110)
CHOLEST SERPL-MCNC: 149 MG/DL
CHOLEST/HDLC SERPL: 2.3 (CALC)
CO2 SERPL-SCNC: 28 MMOL/L (ref 20–32)
CREAT SERPL-MCNC: 0.89 MG/DL (ref 0.6–1)
EGFRCR SERPLBLD CKD-EPI 2021: 69 ML/MIN/1.73M2
ERYTHROCYTE [DISTWIDTH] IN BLOOD BY AUTOMATED COUNT: 12.9 % (ref 11–15)
GLUCOSE SERPL-MCNC: 94 MG/DL (ref 65–99)
HCT VFR BLD AUTO: 40.1 % (ref 35–45)
HDLC SERPL-MCNC: 66 MG/DL
HGB BLD-MCNC: 13.2 G/DL (ref 11.7–15.5)
LDLC SERPL CALC-MCNC: 70 MG/DL (CALC)
MCH RBC QN AUTO: 30 PG (ref 27–33)
MCHC RBC AUTO-ENTMCNC: 32.9 G/DL (ref 32–36)
MCV RBC AUTO: 91.1 FL (ref 80–100)
NONHDLC SERPL-MCNC: 83 MG/DL (CALC)
PLATELET # BLD AUTO: 245 THOUSAND/UL (ref 140–400)
PMV BLD REES-ECKER: 11.7 FL (ref 7.5–12.5)
POTASSIUM SERPL-SCNC: 4.3 MMOL/L (ref 3.5–5.3)
PROT SERPL-MCNC: 6.5 G/DL (ref 6.1–8.1)
RBC # BLD AUTO: 4.4 MILLION/UL (ref 3.8–5.1)
SODIUM SERPL-SCNC: 144 MMOL/L (ref 135–146)
TRIGL SERPL-MCNC: 58 MG/DL
TSH SERPL-ACNC: 0.73 MIU/L (ref 0.4–4.5)
WBC # BLD AUTO: 6.6 THOUSAND/UL (ref 3.8–10.8)

## 2025-07-08 PROCEDURE — RXMED WILLOW AMBULATORY MEDICATION CHARGE

## 2025-07-10 ENCOUNTER — PHARMACY VISIT (OUTPATIENT)
Dept: PHARMACY | Facility: CLINIC | Age: 70
End: 2025-07-10
Payer: COMMERCIAL

## 2025-07-10 ENCOUNTER — APPOINTMENT (OUTPATIENT)
Dept: PRIMARY CARE | Facility: CLINIC | Age: 70
End: 2025-07-10
Payer: MEDICARE

## 2025-08-13 ENCOUNTER — PHARMACY VISIT (OUTPATIENT)
Dept: PHARMACY | Facility: CLINIC | Age: 70
End: 2025-08-13
Payer: COMMERCIAL

## 2025-08-13 ENCOUNTER — OFFICE VISIT (OUTPATIENT)
Dept: PRIMARY CARE | Facility: CLINIC | Age: 70
End: 2025-08-13
Payer: MEDICARE

## 2025-08-13 VITALS
BODY MASS INDEX: 26.86 KG/M2 | DIASTOLIC BLOOD PRESSURE: 70 MMHG | WEIGHT: 161.2 LBS | OXYGEN SATURATION: 95 % | HEART RATE: 46 BPM | SYSTOLIC BLOOD PRESSURE: 163 MMHG | TEMPERATURE: 96.5 F | HEIGHT: 65 IN

## 2025-08-13 DIAGNOSIS — I10 HYPERTENSION, BENIGN: ICD-10-CM

## 2025-08-13 DIAGNOSIS — E55.9 VITAMIN D DEFICIENCY: ICD-10-CM

## 2025-08-13 DIAGNOSIS — R00.1 BRADYCARDIA: Primary | ICD-10-CM

## 2025-08-13 DIAGNOSIS — I48.0 PAROXYSMAL ATRIAL FIBRILLATION (MULTI): ICD-10-CM

## 2025-08-13 DIAGNOSIS — E53.8 VITAMIN B12 DEFICIENCY: ICD-10-CM

## 2025-08-13 PROCEDURE — 3008F BODY MASS INDEX DOCD: CPT | Performed by: FAMILY MEDICINE

## 2025-08-13 PROCEDURE — 3078F DIAST BP <80 MM HG: CPT | Performed by: FAMILY MEDICINE

## 2025-08-13 PROCEDURE — 3077F SYST BP >= 140 MM HG: CPT | Performed by: FAMILY MEDICINE

## 2025-08-13 PROCEDURE — RXMED WILLOW AMBULATORY MEDICATION CHARGE

## 2025-08-13 PROCEDURE — 99214 OFFICE O/P EST MOD 30 MIN: CPT | Performed by: FAMILY MEDICINE

## 2025-08-13 PROCEDURE — 1159F MED LIST DOCD IN RCRD: CPT | Performed by: FAMILY MEDICINE

## 2025-08-13 RX ORDER — LOSARTAN POTASSIUM 50 MG/1
50 TABLET ORAL DAILY
Qty: 90 TABLET | Refills: 3 | Status: SHIPPED | OUTPATIENT
Start: 2025-08-13 | End: 2026-08-13

## 2025-08-13 RX ORDER — CARVEDILOL 6.25 MG/1
6.25 TABLET ORAL 2 TIMES DAILY
Qty: 60 TABLET | Refills: 11 | Status: SHIPPED | OUTPATIENT
Start: 2025-08-13 | End: 2026-08-13

## 2025-08-13 RX ORDER — LOSARTAN POTASSIUM 50 MG/1
50 TABLET ORAL DAILY
Qty: 90 TABLET | Refills: 3 | Status: SHIPPED | OUTPATIENT
Start: 2025-08-13 | End: 2025-08-13 | Stop reason: SDUPTHER

## 2025-08-13 ASSESSMENT — PATIENT HEALTH QUESTIONNAIRE - PHQ9
SUM OF ALL RESPONSES TO PHQ9 QUESTIONS 1 AND 2: 0
1. LITTLE INTEREST OR PLEASURE IN DOING THINGS: NOT AT ALL
2. FEELING DOWN, DEPRESSED OR HOPELESS: NOT AT ALL

## 2025-08-14 DIAGNOSIS — E55.9 VITAMIN D DEFICIENCY: Primary | ICD-10-CM

## 2025-08-14 LAB
25(OH)D3+25(OH)D2 SERPL-MCNC: 23 NG/ML (ref 30–100)
ANION GAP SERPL CALCULATED.4IONS-SCNC: 9 MMOL/L (CALC) (ref 7–17)
BUN SERPL-MCNC: 14 MG/DL (ref 7–25)
BUN/CREAT SERPL: ABNORMAL (CALC) (ref 6–22)
CALCIUM SERPL-MCNC: 9 MG/DL (ref 8.6–10.4)
CHLORIDE SERPL-SCNC: 109 MMOL/L (ref 98–110)
CO2 SERPL-SCNC: 27 MMOL/L (ref 20–32)
CREAT SERPL-MCNC: 0.81 MG/DL (ref 0.6–1)
EGFRCR SERPLBLD CKD-EPI 2021: 77 ML/MIN/1.73M2
GLUCOSE SERPL-MCNC: 103 MG/DL (ref 65–99)
MAGNESIUM SERPL-MCNC: 2.2 MG/DL (ref 1.5–2.5)
POTASSIUM SERPL-SCNC: 4.4 MMOL/L (ref 3.5–5.3)
SODIUM SERPL-SCNC: 145 MMOL/L (ref 135–146)
VIT B12 SERPL-MCNC: 356 PG/ML (ref 200–1100)

## 2025-08-14 RX ORDER — ERGOCALCIFEROL 1.25 MG/1
1.25 CAPSULE ORAL WEEKLY
Qty: 12 CAPSULE | Refills: 0 | Status: SHIPPED | OUTPATIENT
Start: 2025-08-14 | End: 2025-11-06

## 2025-08-15 ENCOUNTER — PHARMACY VISIT (OUTPATIENT)
Dept: PHARMACY | Facility: CLINIC | Age: 70
End: 2025-08-15
Payer: COMMERCIAL

## 2025-08-15 PROCEDURE — RXMED WILLOW AMBULATORY MEDICATION CHARGE

## 2025-08-20 ENCOUNTER — APPOINTMENT (OUTPATIENT)
Dept: PRIMARY CARE | Facility: CLINIC | Age: 70
End: 2025-08-20
Payer: MEDICARE

## 2025-08-26 ENCOUNTER — HOSPITAL ENCOUNTER (OUTPATIENT)
Dept: RADIOLOGY | Facility: HOSPITAL | Age: 70
Discharge: HOME | End: 2025-08-26
Payer: MEDICARE

## 2025-08-26 ENCOUNTER — APPOINTMENT (OUTPATIENT)
Dept: PRIMARY CARE | Facility: CLINIC | Age: 70
End: 2025-08-26
Payer: MEDICARE

## 2025-08-26 VITALS
DIASTOLIC BLOOD PRESSURE: 60 MMHG | HEART RATE: 60 BPM | HEIGHT: 65 IN | OXYGEN SATURATION: 96 % | TEMPERATURE: 96.8 F | BODY MASS INDEX: 26.56 KG/M2 | WEIGHT: 159.4 LBS | SYSTOLIC BLOOD PRESSURE: 132 MMHG

## 2025-08-26 DIAGNOSIS — M25.561 ACUTE PAIN OF RIGHT KNEE: ICD-10-CM

## 2025-08-26 DIAGNOSIS — I10 ESSENTIAL HYPERTENSION: Primary | ICD-10-CM

## 2025-08-26 PROCEDURE — 73562 X-RAY EXAM OF KNEE 3: CPT | Mod: RIGHT SIDE | Performed by: RADIOLOGY

## 2025-08-26 PROCEDURE — 99213 OFFICE O/P EST LOW 20 MIN: CPT | Performed by: FAMILY MEDICINE

## 2025-08-26 PROCEDURE — 3078F DIAST BP <80 MM HG: CPT | Performed by: FAMILY MEDICINE

## 2025-08-26 PROCEDURE — 3075F SYST BP GE 130 - 139MM HG: CPT | Performed by: FAMILY MEDICINE

## 2025-08-26 PROCEDURE — 1159F MED LIST DOCD IN RCRD: CPT | Performed by: FAMILY MEDICINE

## 2025-08-26 PROCEDURE — 73562 X-RAY EXAM OF KNEE 3: CPT | Mod: RT

## 2025-08-26 PROCEDURE — 3008F BODY MASS INDEX DOCD: CPT | Performed by: FAMILY MEDICINE

## 2025-09-04 ENCOUNTER — TELEPHONE (OUTPATIENT)
Dept: PRIMARY CARE | Facility: CLINIC | Age: 70
End: 2025-09-04
Payer: MEDICARE

## 2025-09-04 DIAGNOSIS — G47.33 OBSTRUCTIVE SLEEP APNEA SYNDROME: Primary | ICD-10-CM

## 2025-10-28 ENCOUNTER — APPOINTMENT (OUTPATIENT)
Dept: PRIMARY CARE | Facility: CLINIC | Age: 70
End: 2025-10-28
Payer: MEDICARE